# Patient Record
Sex: FEMALE | Race: WHITE | NOT HISPANIC OR LATINO | Employment: UNEMPLOYED | URBAN - METROPOLITAN AREA
[De-identification: names, ages, dates, MRNs, and addresses within clinical notes are randomized per-mention and may not be internally consistent; named-entity substitution may affect disease eponyms.]

---

## 2017-04-10 ENCOUNTER — ALLSCRIPTS OFFICE VISIT (OUTPATIENT)
Dept: OTHER | Facility: OTHER | Age: 47
End: 2017-04-10

## 2017-04-10 DIAGNOSIS — E66.9 OBESITY: ICD-10-CM

## 2017-04-10 DIAGNOSIS — I10 ESSENTIAL (PRIMARY) HYPERTENSION: ICD-10-CM

## 2017-07-12 ENCOUNTER — TRANSCRIBE ORDERS (OUTPATIENT)
Dept: ADMINISTRATIVE | Facility: HOSPITAL | Age: 47
End: 2017-07-12

## 2017-07-12 DIAGNOSIS — Z12.31 VISIT FOR SCREENING MAMMOGRAM: Primary | ICD-10-CM

## 2017-07-14 ENCOUNTER — HOSPITAL ENCOUNTER (OUTPATIENT)
Dept: MAMMOGRAPHY | Facility: HOSPITAL | Age: 47
Discharge: HOME | End: 2017-07-14
Attending: OBSTETRICS & GYNECOLOGY
Payer: COMMERCIAL

## 2017-07-14 DIAGNOSIS — Z12.31 VISIT FOR SCREENING MAMMOGRAM: ICD-10-CM

## 2017-07-14 PROCEDURE — G0202 SCR MAMMO BI INCL CAD: HCPCS

## 2018-01-13 NOTE — RESULT NOTES
Verified Results  (1) LIPID PANEL, FASTING [id number] 06:14AM Jorge Cassiesarah Palacio Order Number: [id number]     Test Name Result Flag Reference   CHOLESTEROL 161 mg/dL     HDL,DIRECT 56 mg/dL  40-60   Specimen collection should occur prior to Metamizole administration due to the potential for falsely depressed results  LDL CHOLESTEROL CALCULATED 98 mg/dL  0-100   - Patient Instructions: This is a fasting blood test  Water,black tea or black  coffee only after 9:00pm the night before test   Drink 2 glasses of water the morning of test     - Patient Instructions: This is a fasting blood test  Water,black tea or black  coffee only after 9:00pm the night before test Drink 2 glasses of water the morning of test   Triglyceride:         Normal              <150 mg/dl       Borderline High    150-199 mg/dl       High               200-499 mg/dl       Very High          >499 mg/dl  Cholesterol:         Desirable        <200 mg/dl      Borderline High  200-239 mg/dl      High             >239 mg/dl  HDL Cholesterol:        High    >59 mg/dL      Low     <41 mg/dL  LDL CALCULATED:    This screening LDL is a calculated result  It does not have the accuracy of the Direct Measured LDL in the monitoring of patients with hyperlipidemia and/or statin therapy  Direct Measure LDL (FHO285) must be ordered separately in these patients  TRIGLYCERIDES 37 mg/dL  <=150   Specimen collection should occur prior to N-Acetylcysteine or Metamizole administration due to the potential for falsely depressed results  (1) COMPREHENSIVE METABOLIC PANEL [id number] 33:04LF Cassie Patel Order Number: [id number]     Test Name Result Flag Reference   GLUCOSE,RANDM 96 mg/dL     If the patient is fasting, the ADA then defines impaired fasting glucose as > 100 mg/dL and diabetes as > or equal to 123 mg/dL     SODIUM 139 mmol/L  136-145   POTASSIUM 4 1 mmol/L  3 5-5 3   CHLORIDE 102 mmol/L  100-108   CARBON DIOXIDE 29 mmol/L  21-32   ANION GAP (CALC) 8 mmol/L  4-13   BLOOD UREA NITROGEN 17 mg/dL  5-25   CREATININE 0 63 mg/dL  0 60-1 30   Standardized to IDMS reference method   CALCIUM 8 8 mg/dL  8 3-10 1   BILI, TOTAL 0 40 mg/dL  0 20-1 00   ALK PHOSPHATAS 48 U/L     ALT (SGPT) 21 U/L  12-78   AST(SGOT) 17 U/L  5-45   ALBUMIN 3 6 g/dL  3 5-5 0   TOTAL PROTEIN 7 2 g/dL  6 4-8 2   eGFR Non-African American      >60 0 ml/min/1 73sq m   - Patient Instructions: This is a fasting blood test  Water,black tea or black  coffee only after 9:00pm the night before test Drink 2 glasses of water the morning of test   National Kidney Disease Education Program recommendations are as follows:  GFR calculation is accurate only with a steady state creatinine  Chronic Kidney disease less than 60 ml/min/1 73 sq  meters  Kidney failure less than 15 ml/min/1 73 sq  meters  (1) MICROALBUMIN CREATININE RATIO, RANDOM URINE [id number] 06:14AM Bradley Patel Order Number: [id number]     Test Name Result Flag Reference   MICROALBUMIN/ CREAT R <11 mg/g creatinine  0-30   MICROALBUMIN,URINE <5 0 mg/L  0 0-20 0   CREATININE URINE 44 9 mg/dL         Discussion/Summary   CHOLESTEROL LOOKS GOOD   NORMAL LIVER AND KIDNEY FUNCTION   OVERALL LOOKS VERY GOOD   - DR PATEL      Signatures   Electronically signed by : Pina Thompson MD; Dec 16 2016  9:10AM EST                       (Author)

## 2018-01-16 NOTE — RESULT NOTES
Verified Results  (1) LIPID PANEL, FASTING [id number] 06:45AM Kristi Patel     Test Name Result Flag Reference   CHOLESTEROL, TOTAL 179 mg/dL  125-200   HDL CHOLESTEROL 59 mg/dL  > OR = 46   TRIGLICERIDES 52 mg/dL  <706   LDL-CHOLESTEROL 110 mg/dL (calc)  <130   Desirable range <100 mg/dL for patients with CHD or  diabetes and <70 mg/dL for diabetic patients with  known heart disease  CHOL/HDLC RATIO 3 0 (calc)  < OR = 5 0   NON HDL CHOLESTEROL 120 mg/dL (calc)     Target for non-HDL cholesterol is 30 mg/dL higher than   LDL cholesterol target  (1) COMPREHENSIVE METABOLIC PANEL [id number] 02:88PT Kristi Patel     Test Name Result Flag Reference   GLUCOSE 93 mg/dL  65-99   Fasting reference interval   UREA NITROGEN (BUN) 20 mg/dL  7-25   CREATININE 0 71 mg/dL  0 50-1 10   eGFR NON-AFR  AMERICAN 102 mL/min/1 73m2  > OR = 60   eGFR AFRICAN AMERICAN 118 mL/min/1 73m2  > OR = 60   BUN/CREATININE RATIO   1-91   NOT APPLICABLE (calc)   SODIUM 138 mmol/L  135-146   POTASSIUM 4 6 mmol/L  3 5-5 3   CHLORIDE 101 mmol/L     CARBON DIOXIDE 26 mmol/L  19-30   CALCIUM 9 3 mg/dL  8 6-10 2   PROTEIN, TOTAL 7 0 g/dL  6 1-8 1   ALBUMIN 4 1 g/dL  3 6-5 1   GLOBULIN 2 9 g/dL (calc)  1 9-3 7   ALBUMIN/GLOBULIN RATIO 1 4 (calc)  1 0-2 5   BILIRUBIN, TOTAL 0 6 mg/dL  0 2-1 2   ALKALINE PHOSPHATASE 52 U/L     AST 14 U/L  10-35   ALT 12 U/L  6-29     *(Q) VITAMIN D, 25-HYDROXY, LC/MS/MS [id number] 06:45AM Kristi Patel     Test Name Result Flag Reference   VITAMIN D, 25-OH, TOTAL 19 ng/mL L    Vitamin D Status         25-OH Vitamin D:     Deficiency:                    <20 ng/mL  Insufficiency:             20 - 29 ng/mL  Optimal:                 > or = 30 ng/mL     For 25-OH Vitamin D testing on patients on   D2-supplementation and patients for whom quantitation   of D2 and D3 fractions is required, the QuestAssureD(TM)  25-OH VIT D, (D2,D3), LC/MS/MS is recommended: order   code 46309 (patients >2yrs)       For more information on this test, go to:  http://education  [email or web address]/faq/ZUJ573  (This link is being provided for   informational/educational purposes only )     (Q) TSH, 3RD GENERATION W/REFLEX TO FT4 [id number] 06:45AM Kristi Patel   REPORT COMMENT:  FASTING:YES     Test Name Result Flag Reference   TSH W/REFLEX TO FT4 2 57 mIU/L     Reference Range                         > or = 20 Years  0 40-4 50                              Pregnancy Ranges            First trimester    0 26-2 66            Second trimester   0 55-2 73            Third trimester    0 43-2 91       Plan  Vitamin D deficiency    · Vitamin D3 78378 UNIT Oral Capsule; TAKE 1 CAPSULE Weekly    Discussion/Summary   very low vitamin D   take Vitamin D 50,000 IU once a week for 8 weeks and then take Over the counter vitamin D 2000 IU once a day  Normal kidney and liver function   cholesterol looks good!   - Dr Diana Burt   Electronically signed by : Ky Reina MD; Feb 25 2016 10:35AM EST                       (Author)

## 2018-01-18 NOTE — PROGRESS NOTES
Assessment    1  Encounter for preventive health examination (V70 0) (Z00 00)   2  Obesity (278 00) (E66 9)   3  Benign essential hypertension (401 1) (I10)    Plan  Benign essential hypertension    · Lisinopril 10 MG Oral Tablet; TAKE 1 TABLET BY MOUTH ONCE DAILY  Benign essential hypertension, Obesity    · (1) COMPREHENSIVE METABOLIC PANEL; Status:Active; Requested for:10Apr2017;    · (1) MICROALBUMIN CREATININE RATIO, RANDOM URINE; Status:Active; Requested  for:10Apr2017; Health Maintenance    · Follow-up visit in 1 year Evaluation and Treatment  Follow-up  Status: Hold For -  Scheduling  Requested for: 10Apr2017   · Begin a limited exercise program ; Status:Complete;   Done: 30WDC7278   · Begin or continue regular aerobic exercise  Gradually work up to at least 3 sessions of 30  minutes of exercise a week ; Status:Complete;   Done: 18CFI3890   · Brush your teeth 3 times a day and floss at least once a day ; Status:Complete;   Done:  06MFM9816   · Drink plenty of fluids ; Status:Complete;   Done: 73MZZ3971   · Eat a low fat and low cholesterol diet ; Status:Complete;   Done: 89REA9779   · Eat a normal well-balanced diet ; Status:Complete;   Done: 10ZBT3913   · Use a sun block product with an SPF of 15 or more ; Status:Complete;   Done:  77KVL5796   · Vitamins can help you get daily requirements that your diet may not be giving you ;  Status:Complete;   Done: 06BQO7454   · We recommend routine visits to a dentist ; Status:Complete;   Done: 64AOJ0729   · We recommend that you bring your body mass index down to 26 ; Status:Complete;    Done: 35NVO7974   · Call [phone] if: You find a new or different kind of lump in your breast ;  Status:Complete;   Done: 54JCM0108   · Call [phone] if: You have any warning signs of skin cancer ; Status:Complete;    Done: 36YXU2921    Discussion/Summary  health maintenance visit Currently, she eats an adequate diet and eats a poor diet   the risks and benefits of cervical cancer screening were discussed cervical cancer screening is needed every three years      Chief Complaint  pt here for physical      History of Present Illness  HM, Adult Female: The patient is being seen for a health maintenance evaluation  The last health maintenance visit was 1 year(s) ago  General Health: The patient's health since the last visit is described as good  She has regular dental visits  She complains of vision problems  She denies hearing loss  Immunizations status: up to date  Lifestyle:  She consumes a diverse and healthy diet  She does not have any weight concerns  She exercises regularly  She does not use tobacco  She denies alcohol use  She denies drug use  Reproductive health:  she reports abnormal menses  Menstrual history: The cycles have been less frequent  she uses no contraception  she is sexually active  pregnancy history: G 1P 1, 1  Screening: cancer screening reviewed and updated  metabolic screening reviewed and updated  risk screening reviewed and updated  Review of Systems    Constitutional: No fever, no chills, feels well, no tiredness, no recent weight gain or weight loss  Eyes: No complaints of eye pain, no red eyes, no eyesight problems, no discharge, no dry eyes, no itching of eyes  ENT: no complaints of earache, no loss of hearing, no nose bleeds, no nasal discharge, no sore throat, no hoarseness  Cardiovascular: No complaints of slow heart rate, no fast heart rate, no chest pain, no palpitations, no leg claudication, no lower extremity edema  Respiratory: No complaints of shortness of breath, no wheezing, no cough, no SOB on exertion, no orthopnea, no PND  Gastrointestinal: No complaints of abdominal pain, no constipation, no nausea or vomiting, no diarrhea, no bloody stools  Genitourinary: No complaints of dysuria, no incontinence, no pelvic pain, no dysmenorrhea, no vaginal discharge or bleeding     Musculoskeletal: No complaints of arthralgias, no myalgias, no joint swelling or stiffness, no limb pain or swelling  Integumentary: No complaints of skin rash or lesions, no itching, no skin wounds, no breast pain or lump  Neurological: No complaints of headache, no confusion, no convulsions, no numbness, no dizziness or fainting, no tingling, no limb weakness, no difficulty walking  Psychiatric: Not suicidal, no sleep disturbance, no anxiety or depression, no change in personality, no emotional problems  Endocrine: No complaints of proptosis, no hot flashes, no muscle weakness, no deepening of the voice, no feelings of weakness  Hematologic/Lymphatic: No complaints of swollen glands, no swollen glands in the neck, does not bleed easily, does not bruise easily  Active Problems    1  Acid reflux disease (530 81) (K21 9)   2  Anxiety (300 00) (F41 9)   3  Benign essential hypertension (401 1) (I10)   4  Encounter for gynecological examination without abnormal finding (V72 31) (Z01 419)   5  Influenza vaccine needed (V04 81) (Z23)   6  Need for Tdap vaccination (V06 1) (Z23)   7  Obesity (278 00) (E66 9)   8  Seasonal allergies (477 9) (J30 2)   9   Vitamin D deficiency (268 9) (E55 9)    Past Medical History    · 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)   · History of Anaphylaxis (V13 81)   · History of herpes labialis (V12 09) (Z86 19)   · History of herpes zoster (V12 09) (Z86 19)   · History of Left foot pain (729 5) (M79 672)   · History of Malignant neoplasm without specification of site (199 1) (C80 1)    Surgical History    · History of Biopsy Tongue Location Base   · History of  Section   · History of Glossectomy Complete With Unilateral Radical Neck Dissection    Family History  Mother    · Family history of Diabetes Mellitus (V18 0)  Father    · Family history of Basal Cell Adenocarcinoma   · Family history of cardiac disorder (V17 49) (Z80 55)    Social History    · Living Situations   · daughter and  · Marital History - Currently    · Never A Smoker   · Social alcohol use (Z78 9)   · Tea    Current Meds   1  Aleve TABS; Therapy: (Recorded:04Oct2013) to Recorded   2  Claritin-D 24 Hour  MG Oral Tablet Extended Release 24 Hour; TAKE 1 TABLET   DAILY AS DIRECTED; Therapy: 72HCT6892 to (Evaluate:35Gmp8726) Recorded   3  EpiPen 2-Johnnie 0 3 MG/0 3ML Injection Solution Auto-injector; INJECT 0 3ML   INTRAMUSCULARLY AS DIRECTED; Therapy: 06CGC2366 to (Last Rx:13Jan2016)  Requested for: 01PPT5614 Ordered   4  Lisinopril 10 MG Oral Tablet; TAKE 1 TABLET BY MOUTH ONCE DAILY; Therapy: 80MAC5062 to (Evaluate:08Jan2017)  Requested for: 18DRB6552; Last   Rx:74Fjt4983 Ordered   5  Pantoprazole Sodium 40 MG Oral Tablet Delayed Release; TAKE 1 TABLET DAILY; Therapy: 98GPF0993 to (Evaluate:49Tzk5358)  Requested for: 04IFL0737; Last   Rx:60Nxk9618 Ordered   6  Vitamin D3 2000 UNIT Oral Tablet; Take 1 tablet daily; Therapy: 30MRC5746 to (Evaluate:11Tzm9193) Recorded    Allergies    1  Sulfites   2  Penicillins    3  Dust   4  Other   5  Peanuts   6  Seasonal    Vitals   Recorded: 10Apr2017 09:01AM Recorded: 10Apr2017 09:00AM   Heart Rate 98    Respiration 18    Systolic 584    Diastolic 84    Height  5 ft 3 11 in   Weight  208 lb 2 oz   BMI Calculated  36 74   BSA Calculated  1 97   O2 Saturation 97      Physical Exam    Constitutional   General appearance: No acute distress, well appearing and well nourished  Head and Face   Head and face: Normal     Palpation of the face and sinuses: No sinus tenderness  Eyes   Conjunctiva and lids: No swelling, erythema or discharge  Pupils and irises: Equal, round, reactive to light  Ophthalmoscopic examination: Normal fundi and optic discs  Ears, Nose, Mouth, and Throat   External inspection of ears and nose: Normal     Otoscopic examination: Tympanic membranes translucent with normal light reflex  Canals patent without erythema      Hearing: Normal     Nasal mucosa, septum, and turbinates: Normal without edema or erythema  Lips, teeth, and gums: Normal, good dentition  Oropharynx: Normal with no erythema, edema, exudate or lesions  Neck   Neck: Supple, symmetric, trachea midline, no masses  Thyroid: Normal, no thyromegaly  Pulmonary   Respiratory effort: No increased work of breathing or signs of respiratory distress  Percussion of chest: Normal     Auscultation of lungs: Clear to auscultation  Cardiovascular   Palpation of heart: Normal PMI, no thrills  Auscultation of heart: Normal rate and rhythm, normal S1 and S2, no murmurs  Examination of extremities for edema and/or varicosities: Normal     Chest   Breasts: Normal, no dimpling or skin changes appreciated  Chest: Normal     Lymphatic   Palpation of lymph nodes in neck: No lymphadenopathy  Palpation of lymph nodes in axillae: No lymphadenopathy  Musculoskeletal   Gait and station: Normal     Digits and nails: Normal without clubbing or cyanosis  Joints, bones, and muscles: Normal     Range of motion: Normal     Stability: Normal     Muscle strength/tone: Normal     Skin   Skin and subcutaneous tissue: Normal without rashes or lesions  Palpation of skin and subcutaneous tissue: Normal turgor  Neurologic   Cranial nerves: Cranial nerves II-XII intact  Cortical function: Normal mental status  Reflexes: 2+ and symmetric  Sensation: No sensory loss  Coordination: Normal finger to nose and heel to shin  Psychiatric   Judgment and insight: Normal     Orientation to person, place, and time: Normal     Recent and remote memory: Intact  Mood and affect: Normal        Results/Data  PHQ-2 Adult Depression Screening 31Dne2814 09:01AM User, Ahs     Test Name Result Flag Reference   PHQ-2 Adult Depression Score 0     Over the last two weeks, how often have you been bothered by any of the following problems?   Little interest or pleasure in doing things: Not at all - 0  Feeling down, depressed, or hopeless: Not at all - 0   PHQ-2 Adult Depression Screening Negative         Signatures   Electronically signed by : Moisés Angel MD; Apr 10 2017  9:21AM EST                       (Author)

## 2018-01-18 NOTE — RESULT NOTES
Verified Results  * XR FOOT 3+ VIEW LEFT [id number] 08:23AM Chit, [place] Order Number: [id number]     Test Name Result Flag Reference   XR FOOT 3+ VW LEFT (Report)     LEFT FOOT     INDICATION: Chronic medial foot pain     COMPARISON: None     VIEWS: 3; 3 images     FINDINGS:     There is no acute fracture or dislocation  Large calcaneal heel spur is noted  Joint spaces within the foot are preserved  No lytic or blastic lesions are seen  Soft tissues are unremarkable  IMPRESSION:     Large calcaneal heel spur       Workstation performed: [id number]     Signed by:   Aristides Santiago MD   3/10/16       Discussion/Summary   large heel spur on left foot   may see podiatrist as instructed     - Dr Suzanna Nelson   Electronically signed by : Michelle Robles MD; Mar 10 2016  2:36PM EST                       (Author)

## 2018-01-22 VITALS
HEART RATE: 98 BPM | DIASTOLIC BLOOD PRESSURE: 84 MMHG | OXYGEN SATURATION: 97 % | SYSTOLIC BLOOD PRESSURE: 132 MMHG | WEIGHT: 208.13 LBS | HEIGHT: 63 IN | RESPIRATION RATE: 18 BRPM | BODY MASS INDEX: 36.88 KG/M2

## 2018-04-25 DIAGNOSIS — I10 ESSENTIAL HYPERTENSION: Primary | ICD-10-CM

## 2018-04-25 RX ORDER — LISINOPRIL 10 MG/1
TABLET ORAL
Qty: 90 TABLET | Refills: 0 | Status: SHIPPED | OUTPATIENT
Start: 2018-04-25 | End: 2018-07-26 | Stop reason: SDUPTHER

## 2018-07-26 DIAGNOSIS — I10 ESSENTIAL HYPERTENSION: ICD-10-CM

## 2018-07-26 RX ORDER — LISINOPRIL 10 MG/1
TABLET ORAL
Qty: 90 TABLET | Refills: 0 | Status: SHIPPED | OUTPATIENT
Start: 2018-07-26 | End: 2018-10-25 | Stop reason: SDUPTHER

## 2018-08-13 RX ORDER — LORATADINE AND PSEUDOEPHEDRINE 10; 240 MG/1; MG/1
1 TABLET, EXTENDED RELEASE ORAL AS NEEDED
COMMUNITY
Start: 2016-01-13

## 2018-08-13 RX ORDER — PANTOPRAZOLE SODIUM 40 MG/1
40 TABLET, DELAYED RELEASE ORAL DAILY
Refills: 3 | COMMUNITY
Start: 2018-07-25 | End: 2018-11-24 | Stop reason: SDUPTHER

## 2018-08-13 RX ORDER — NAPROXEN SODIUM 220 MG
TABLET ORAL
COMMUNITY

## 2018-08-13 RX ORDER — EPINEPHRINE 0.3 MG/.3ML
0.3 INJECTION SUBCUTANEOUS
COMMUNITY
Start: 2016-01-13 | End: 2018-08-14 | Stop reason: SDUPTHER

## 2018-08-14 ENCOUNTER — OFFICE VISIT (OUTPATIENT)
Dept: FAMILY MEDICINE CLINIC | Facility: CLINIC | Age: 48
End: 2018-08-14
Payer: COMMERCIAL

## 2018-08-14 VITALS
WEIGHT: 199 LBS | HEART RATE: 80 BPM | HEIGHT: 63 IN | RESPIRATION RATE: 18 BRPM | DIASTOLIC BLOOD PRESSURE: 70 MMHG | SYSTOLIC BLOOD PRESSURE: 110 MMHG | BODY MASS INDEX: 35.26 KG/M2

## 2018-08-14 DIAGNOSIS — Z12.31 VISIT FOR SCREENING MAMMOGRAM: ICD-10-CM

## 2018-08-14 DIAGNOSIS — I10 BENIGN ESSENTIAL HYPERTENSION: ICD-10-CM

## 2018-08-14 DIAGNOSIS — E55.9 VITAMIN D DEFICIENCY: ICD-10-CM

## 2018-08-14 DIAGNOSIS — Z91.030 ALLERGY TO BEE STING: ICD-10-CM

## 2018-08-14 DIAGNOSIS — R22.1 LUMP IN NECK: ICD-10-CM

## 2018-08-14 DIAGNOSIS — Z00.00 ENCOUNTER FOR WELL ADULT EXAM WITHOUT ABNORMAL FINDINGS: Primary | ICD-10-CM

## 2018-08-14 DIAGNOSIS — K21.9 GASTROESOPHAGEAL REFLUX DISEASE WITHOUT ESOPHAGITIS: ICD-10-CM

## 2018-08-14 DIAGNOSIS — N95.9 PERIMENOPAUSAL DISORDER: ICD-10-CM

## 2018-08-14 PROCEDURE — 3725F SCREEN DEPRESSION PERFORMED: CPT | Performed by: FAMILY MEDICINE

## 2018-08-14 PROCEDURE — 99396 PREV VISIT EST AGE 40-64: CPT | Performed by: FAMILY MEDICINE

## 2018-08-14 RX ORDER — RANITIDINE 150 MG/1
150 CAPSULE ORAL 2 TIMES DAILY
Qty: 180 CAPSULE | Refills: 0 | Status: SHIPPED | OUTPATIENT
Start: 2018-08-14 | End: 2018-11-07 | Stop reason: SDUPTHER

## 2018-08-14 RX ORDER — FLUOXETINE 10 MG/1
10 TABLET, FILM COATED ORAL DAILY
Qty: 90 TABLET | Refills: 0 | Status: SHIPPED | OUTPATIENT
Start: 2018-08-14 | End: 2018-11-07 | Stop reason: SDUPTHER

## 2018-08-14 RX ORDER — EPINEPHRINE 0.3 MG/.3ML
0.3 INJECTION SUBCUTANEOUS ONCE
Qty: 0.3 ML | Refills: 3 | Status: SHIPPED | OUTPATIENT
Start: 2018-08-14 | End: 2019-02-24

## 2018-08-14 NOTE — PROGRESS NOTES
Noam Martinez was seen today for annual exam     Diagnoses and all orders for this visit:    Encounter for well adult exam without abnormal findings    Vitamin D deficiency  -     Vitamin D 25 hydroxy; Future    Benign essential hypertension  -     Comprehensive metabolic panel  -     Lipid Panel with Direct LDL reflex; Future  -     CBC and differential; Future    Lump in neck  -     US head neck soft tissue; Future    Allergy to bee sting  -     EPINEPHrine (EPIPEN 2-VINCE) 0 3 mg/0 3 mL SOAJ; Inject 0 3 mL (0 3 mg total) into a muscle once for 1 dose    Perimenopausal disorder  -     FLUoxetine (PROzac) 10 MG tablet; Take 1 tablet (10 mg total) by mouth daily    Gastroesophageal reflux disease without esophagitis  -     ranitidine (ZANTAC) 150 MG capsule; Take 1 capsule (150 mg total) by mouth 2 (two) times a day    Visit for screening mammogram  -     Mammo screening bilateral w cad; Future      Patient Counseling:  --Nutrition: Stressed importance of moderation in sodium/caffeine intake, saturated fat and cholesterol, caloric balance, sufficient intake of fresh fruits, vegetables, fiber, calcium, iron, and 1 mg of folate supplement per day   --Discussed  calcium supplement, and the daily use of baby aspirin  --Exercise: Stressed the importance of regular exercise  --Substance Abuse: Discussed cessation/primary prevention of tobacco, alcohol, or other drug use; driving or other dangerous activities under the influence; availability of treatment for abuse  --Sexuality: Discussed sexually transmitted diseases, partner selection, use of condoms, avoidance of unintended pregnancy  and contraceptive alternatives  --Injury prevention: Discussed safety belts, safety helmets, smoke detector, smoking near bedding or upholstery  --Dental health: Discussed importance of regular tooth brushing, flossing, and dental visits  --Immunizations reviewed    --Discussed benefits of screening colonoscopy    Chief Complaint Patient presents with    Annual Exam       HPI    Patient here for a comprehensive physical exam The patient reports no problems    Do you take any herbs or supplements that were not prescribed by a doctor? no   Are you taking calcium supplements? no   Are you taking aspirin daily? no  How often do you exercise? Walking around   Diet?  3-4 servings of fruits and vegetables   Dental visit:  A couple of years ago     Vision test: recently  Colonoscopy:  5 years ago      History:  LMP: perimenopausal- 8 months ago was last periods   Last pap date: 1 year ago   Abnormal pap? no  : 1  Para: 1          History   Sexual Activity    Sexual activity: Not on file             Review of Systems   Constitutional: Negative  HENT: Negative  Eyes: Negative  Respiratory: Negative  Cardiovascular: Negative  Gastrointestinal: Negative  Endocrine: Negative  Genitourinary: Negative  Musculoskeletal: Negative  Skin: Negative  Allergic/Immunologic: Negative  Neurological: Negative  Hematological: Negative  Psychiatric/Behavioral: Negative  Family History   Problem Relation Age of Onset    Diabetes Mother     Cancer Father     Heart disease Father        Past Medical History:   Diagnosis Date    Anaphylaxis     Cancer ([address] )     Tongue,     Herpes zoster     last Assessed 2016         Social History     Social History    Marital status: /Civil Union     Spouse name: N/A    Number of children: N/A    Years of education: N/A     Occupational History    Not on file       Social History Main Topics    Smoking status: Never Smoker    Smokeless tobacco: Former User    Alcohol use Yes      Comment: Social    Drug use: Unknown    Sexual activity: Not on file     Other Topics Concern    Not on file     Social History Narrative    Drinks Tea       Current Outpatient Prescriptions on File Prior to Visit   Medication Sig Dispense Refill    lisinopril (ZESTRIL) 10 mg tablet TAKE 1 TABLET BY MOUTH ONCE DAILY 90 tablet 0     No current facility-administered medications on file prior to visit  Allergies   Allergen Reactions    Penicillins      Annotation - [id number]: as achild    Seasonal Ic  [Cholestatin]     Sulfites Hives, Rash and Throat Swelling         Vitals:    08/14/18 1539   BP: 110/70   BP Location: Left arm   Patient Position: Sitting   Cuff Size: Standard   Pulse: 80   Resp: 18   Weight: 90 3 kg (199 lb)   Height: 5' 3" (1 6 m)         Physical Exam   Constitutional: She is oriented to person, place, and time  Vital signs are normal  She appears well-developed and well-nourished  HENT:   Head: Normocephalic and atraumatic  Nose: Nose normal    Mouth/Throat: Oropharynx is clear and moist    Eyes: Pupils are equal, round, and reactive to light  Neck: Normal range of motion  Neck supple  No thyromegaly present  Left chin   Cardiovascular: Normal rate and regular rhythm  No murmur heard  Pulmonary/Chest: Effort normal and breath sounds normal    Abdominal: Soft  Bowel sounds are normal    Musculoskeletal: Normal range of motion  She exhibits no edema or deformity  Lymphadenopathy:     She has cervical adenopathy  Neurological: She is alert and oriented to person, place, and time  She has normal reflexes  Skin: Skin is warm  No rash noted  No erythema  Psychiatric: She has a normal mood and affect   Her behavior is normal

## 2018-09-07 LAB
25(OH)D3 SERPL-MCNC: 80 NG/ML (ref 30–100)
ALBUMIN SERPL-MCNC: 4.4 G/DL (ref 3.6–5.1)
ALBUMIN/GLOB SERPL: 1.7 (CALC) (ref 1–2.5)
ALP SERPL-CCNC: 51 U/L (ref 33–115)
ALT SERPL-CCNC: 14 U/L (ref 6–29)
AST SERPL-CCNC: 19 U/L (ref 10–35)
BASOPHILS # BLD AUTO: 20 CELLS/UL (ref 0–200)
BASOPHILS NFR BLD AUTO: 0.3 %
BILIRUB SERPL-MCNC: 0.8 MG/DL (ref 0.2–1.2)
BUN SERPL-MCNC: 10 MG/DL (ref 7–25)
BUN/CREAT SERPL: NORMAL (CALC) (ref 6–22)
CALCIUM SERPL-MCNC: 9.6 MG/DL (ref 8.6–10.2)
CHLORIDE SERPL-SCNC: 98 MMOL/L (ref 98–110)
CHOLEST SERPL-MCNC: 180 MG/DL
CHOLEST/HDLC SERPL: 3.1 (CALC)
CO2 SERPL-SCNC: 28 MMOL/L (ref 20–32)
CREAT SERPL-MCNC: 0.72 MG/DL (ref 0.5–1.1)
EOSINOPHIL # BLD AUTO: 252 CELLS/UL (ref 15–500)
EOSINOPHIL NFR BLD AUTO: 3.7 %
ERYTHROCYTE [DISTWIDTH] IN BLOOD BY AUTOMATED COUNT: 13.3 % (ref 11–15)
GLOBULIN SER CALC-MCNC: 2.6 G/DL (CALC) (ref 1.9–3.7)
GLUCOSE SERPL-MCNC: 98 MG/DL (ref 65–99)
HCT VFR BLD AUTO: 42.5 % (ref 35–45)
HDLC SERPL-MCNC: 59 MG/DL
HGB BLD-MCNC: 14 G/DL (ref 11.7–15.5)
LDLC SERPL CALC-MCNC: 105 MG/DL (CALC)
LYMPHOCYTES # BLD AUTO: 1884 CELLS/UL (ref 850–3900)
LYMPHOCYTES NFR BLD AUTO: 27.7 %
MCH RBC QN AUTO: 29.4 PG (ref 27–33)
MCHC RBC AUTO-ENTMCNC: 32.9 G/DL (ref 32–36)
MCV RBC AUTO: 89.3 FL (ref 80–100)
MONOCYTES # BLD AUTO: 653 CELLS/UL (ref 200–950)
MONOCYTES NFR BLD AUTO: 9.6 %
NEUTROPHILS # BLD AUTO: 3992 CELLS/UL (ref 1500–7800)
NEUTROPHILS NFR BLD AUTO: 58.7 %
NONHDLC SERPL-MCNC: 121 MG/DL (CALC)
PLATELET # BLD AUTO: 238 THOUSAND/UL (ref 140–400)
PMV BLD REES-ECKER: 11.2 FL (ref 7.5–12.5)
POTASSIUM SERPL-SCNC: 4.4 MMOL/L (ref 3.5–5.3)
PROT SERPL-MCNC: 7 G/DL (ref 6.1–8.1)
RBC # BLD AUTO: 4.76 MILLION/UL (ref 3.8–5.1)
SL AMB EGFR AFRICAN AMERICAN: 114 ML/MIN/1.73M2
SL AMB EGFR NON AFRICAN AMERICAN: 98 ML/MIN/1.73M2
SODIUM SERPL-SCNC: 136 MMOL/L (ref 135–146)
TRIGL SERPL-MCNC: 69 MG/DL
WBC # BLD AUTO: 6.8 THOUSAND/UL (ref 3.8–10.8)

## 2018-09-17 ENCOUNTER — HOSPITAL ENCOUNTER (OUTPATIENT)
Dept: ULTRASOUND IMAGING | Facility: HOSPITAL | Age: 48
Discharge: HOME | End: 2018-09-17
Payer: COMMERCIAL

## 2018-09-17 DIAGNOSIS — R22.1 LUMP IN NECK: ICD-10-CM

## 2018-09-17 PROCEDURE — 76536 US EXAM OF HEAD AND NECK: CPT

## 2018-10-25 DIAGNOSIS — I10 ESSENTIAL HYPERTENSION: ICD-10-CM

## 2018-10-25 RX ORDER — LISINOPRIL 10 MG/1
TABLET ORAL
Qty: 90 TABLET | Refills: 3 | Status: SHIPPED | OUTPATIENT
Start: 2018-10-25 | End: 2019-10-11 | Stop reason: SDUPTHER

## 2018-11-07 DIAGNOSIS — N95.9 PERIMENOPAUSAL DISORDER: ICD-10-CM

## 2018-11-07 DIAGNOSIS — K21.9 GASTROESOPHAGEAL REFLUX DISEASE WITHOUT ESOPHAGITIS: ICD-10-CM

## 2018-11-07 RX ORDER — FLUOXETINE 10 MG/1
TABLET, FILM COATED ORAL
Qty: 90 TABLET | Refills: 0 | Status: SHIPPED | OUTPATIENT
Start: 2018-11-07 | End: 2019-01-31 | Stop reason: SDUPTHER

## 2018-11-07 RX ORDER — RANITIDINE 150 MG/1
TABLET ORAL
Qty: 180 TABLET | Refills: 0 | Status: ON HOLD | OUTPATIENT
Start: 2018-11-07 | End: 2019-01-29 | Stop reason: ALTCHOICE

## 2018-11-12 ENCOUNTER — HOSPITAL ENCOUNTER (OUTPATIENT)
Dept: MAMMOGRAPHY | Facility: HOSPITAL | Age: 48
Discharge: HOME | End: 2018-11-12
Payer: COMMERCIAL

## 2018-11-12 VITALS — WEIGHT: 199 LBS | HEIGHT: 63 IN | BODY MASS INDEX: 35.26 KG/M2

## 2018-11-12 DIAGNOSIS — Z12.31 VISIT FOR SCREENING MAMMOGRAM: ICD-10-CM

## 2018-11-12 PROCEDURE — 77067 SCR MAMMO BI INCL CAD: CPT

## 2018-11-12 PROCEDURE — 77063 BREAST TOMOSYNTHESIS BI: CPT

## 2018-11-14 ENCOUNTER — HOSPITAL ENCOUNTER (OUTPATIENT)
Dept: MAMMOGRAPHY | Facility: CLINIC | Age: 48
Discharge: HOME | End: 2018-11-14
Payer: COMMERCIAL

## 2018-11-14 ENCOUNTER — HOSPITAL ENCOUNTER (OUTPATIENT)
Dept: ULTRASOUND IMAGING | Facility: CLINIC | Age: 48
Discharge: HOME | End: 2018-11-14
Payer: COMMERCIAL

## 2018-11-14 DIAGNOSIS — R92.8 ABNORMAL MAMMOGRAM: ICD-10-CM

## 2018-11-14 PROCEDURE — G0279 TOMOSYNTHESIS, MAMMO: HCPCS

## 2018-11-14 PROCEDURE — 77065 DX MAMMO INCL CAD UNI: CPT

## 2018-11-15 NOTE — PROGRESS NOTES
Met with patient and Dr Dorna Goltz regarding recommendation for;      _____ RIGHT ___x___LEFT      _____Ultrasound guided  ___x___Stereotactic  Breast biopsy  __x___Verbalized understanding        Blood thinners:  _____yes __x___no    Date stopped: ___n/a________    Biopsy teaching sheet given:  ___x____yes ______no

## 2018-11-19 ENCOUNTER — IMMUNIZATION (OUTPATIENT)
Dept: FAMILY MEDICINE CLINIC | Facility: CLINIC | Age: 48
End: 2018-11-19
Payer: COMMERCIAL

## 2018-11-19 DIAGNOSIS — Z23 ENCOUNTER FOR IMMUNIZATION: ICD-10-CM

## 2018-11-19 DIAGNOSIS — Z23 NEED FOR INFLUENZA VACCINATION: Primary | ICD-10-CM

## 2018-11-19 PROCEDURE — 90686 IIV4 VACC NO PRSV 0.5 ML IM: CPT

## 2018-11-19 PROCEDURE — 90471 IMMUNIZATION ADMIN: CPT

## 2018-11-24 DIAGNOSIS — K21.9 GASTROESOPHAGEAL REFLUX DISEASE WITHOUT ESOPHAGITIS: Primary | ICD-10-CM

## 2018-11-25 RX ORDER — PANTOPRAZOLE SODIUM 40 MG/1
TABLET, DELAYED RELEASE ORAL
Qty: 90 TABLET | Refills: 3 | Status: SHIPPED | OUTPATIENT
Start: 2018-11-25 | End: 2019-11-15 | Stop reason: SDUPTHER

## 2018-11-26 ENCOUNTER — HOSPITAL ENCOUNTER (OUTPATIENT)
Dept: MAMMOGRAPHY | Facility: CLINIC | Age: 48
Discharge: HOME | End: 2018-11-26
Admitting: RADIOLOGY
Payer: COMMERCIAL

## 2018-11-26 ENCOUNTER — HOSPITAL ENCOUNTER (OUTPATIENT)
Dept: MAMMOGRAPHY | Facility: CLINIC | Age: 48
Discharge: HOME | End: 2018-11-26
Payer: COMMERCIAL

## 2018-11-26 VITALS — HEART RATE: 60 BPM | DIASTOLIC BLOOD PRESSURE: 80 MMHG | SYSTOLIC BLOOD PRESSURE: 132 MMHG

## 2018-11-26 DIAGNOSIS — R92.8 ABNORMAL MAMMOGRAM: ICD-10-CM

## 2018-11-26 PROCEDURE — 88305 TISSUE EXAM BY PATHOLOGIST: CPT | Performed by: PATHOLOGY

## 2018-11-26 PROCEDURE — 19081 BX BREAST 1ST LESION STRTCTC: CPT

## 2018-11-26 RX ORDER — CHLOROPROCAINE HYDROCHLORIDE 30 MG/ML
10 INJECTION, SOLUTION EPIDURAL; INFILTRATION; INTRACAUDAL; PERINEURAL ONCE
Status: COMPLETED | OUTPATIENT
Start: 2018-11-26 | End: 2018-11-26

## 2018-11-26 RX ORDER — LIDOCAINE HYDROCHLORIDE 10 MG/ML
4 INJECTION, SOLUTION INFILTRATION; PERINEURAL ONCE
Status: COMPLETED | OUTPATIENT
Start: 2018-11-26 | End: 2018-11-26

## 2018-11-26 RX ADMIN — LIDOCAINE HYDROCHLORIDE 4 ML: 10 INJECTION, SOLUTION INFILTRATION; PERINEURAL at 08:50

## 2018-11-26 RX ADMIN — CHLOROPROCAINE HYDROCHLORIDE 10 ML: 30 INJECTION, SOLUTION EPIDURAL; INFILTRATION; INTRACAUDAL; PERINEURAL at 08:50

## 2018-11-26 NOTE — DISCHARGE INSTR - OTHER ORDERS
POST LARGE CORE BREAST BIOPSY PATIENT INFORMATION      1  Place an ice pack inside your bra over the top of the dressing every hour for 20 minutes (20 minutes on, 60 minutes off)  Do this until bedtime  2  Do not shower or bathe until the following morning  3  You may bathe your breast carefully with the steri-strips in place  Be careful    Not to loosen them  The steri-strips will fall off in 3-5 days  4  You may have mild discomfort, and you may have some bruising where the   Needle entered the skin  This should clear within 5-7 days  5  If you need medicine for discomfort, take acetaminophen products such as   Tylenol  You may also take Advil or Motrin products  6  Do not participate in strenuous activities such as-tennis, aerobics, skiing,  Weight lifting, etc  for 24 hours  Refrain from swimming/soaking for 72 hours  7  Wearing a bra for sleeping may be more comfortable for the first 24-48 hours  8  Watch for continued bleeding, pain or fever over 101; please call with any questions or concerns  For procedures done at the [facility] call:  Anitha Martínez RN at [phone]  Lis Dowling RN at [phone]                    *After 4 PM call the Interventional Radiology Department                    [phone] and ask to speak with the nurse on call  For procedures done at the [address] [place] [address] call:         Skylar Scales RN at   *After 4 PM call the Interventional Radiology Department   [phone] and ask to speak with the nurse on call  For procedures done at [address] [place] [address] call: The Radiology Nurse at [phone]  *After 4 PM call your physician, or go to the Emergency Department  9          The final results of your biopsy are usually available within one week

## 2018-11-26 NOTE — PROGRESS NOTES
Procedure type:    _____ultrasound guided __x___stereotactic    Breast:    __x___Left _____Right    Location:     Needle: 8g Mammotome Revolve    # of passes: 3 cores with calcs (specimen A) 2 cores without calcs (specimen B)    Clip: Mammomark triple twist    Performed by: Dr Rodolfo Bourgeois held for 5 minutes by: Gisella Gan Strips:    __x___yes _____no    Band aid:    __x___yes_____no    Tape and guaze:    __x___yes _____no    Tolerated procedure:    __x___yes _____no

## 2018-11-26 NOTE — PROGRESS NOTES
Per David Borrego, the pharmacist, do not use the Lidocaine with Epinephrine due to pt's Sulfite allergy  Per David Borrego, sulfite is a preservative in the epinephrine which pt has an allergy to

## 2018-11-27 NOTE — PROGRESS NOTES
Post procedure call completed on 11/17/18 @ 1215      Bleeding: _____yes __x___no    Pain: _____yes __x____no    Redness/Swelling: ______yes __x____no    Band aid removed: __x___yes _____no    Steri-Strips intact: __x____yes _____no

## 2018-11-28 DIAGNOSIS — N60.99 INTRADUCTAL HYPERPLASIA WITHOUT ATYPIA OF BREAST: Primary | ICD-10-CM

## 2018-11-30 ENCOUNTER — TELEPHONE (OUTPATIENT)
Dept: MAMMOGRAPHY | Facility: CLINIC | Age: 48
End: 2018-11-30

## 2018-11-30 NOTE — PROGRESS NOTES
Patient Past Medical History: HTN;   Ulcer;   Arthritis          Allergies: Sulfites-Anaphylaxis      Bees-Anaphylaxis      PCN - Unknown        Past Breast History:     Previous Biopsy:   Yes______ No___x_____      Breast: Right____ Left______       Malignant______ Benign_______      Treatments if applicable        Present Breast History:     Right__________    Left_____x________     Density_________    Calcifications____________   Palpable______________      Patient notified of results on:  11/30/18 by Dr Aaliyah Cade    Date of Appointment with Surgeon Dr Ama Gil on 12/19/18 @ 1 pm

## 2018-12-17 PROBLEM — N60.92 ATYPICAL DUCTAL HYPERPLASIA OF LEFT BREAST: Status: ACTIVE | Noted: 2018-12-17

## 2018-12-19 ENCOUNTER — CONSULT (OUTPATIENT)
Dept: SURGICAL ONCOLOGY | Facility: CLINIC | Age: 48
End: 2018-12-19
Payer: COMMERCIAL

## 2018-12-19 VITALS
TEMPERATURE: 98.4 F | RESPIRATION RATE: 16 BRPM | WEIGHT: 193 LBS | BODY MASS INDEX: 34.2 KG/M2 | HEART RATE: 82 BPM | SYSTOLIC BLOOD PRESSURE: 160 MMHG | HEIGHT: 63 IN | DIASTOLIC BLOOD PRESSURE: 82 MMHG

## 2018-12-19 DIAGNOSIS — N60.92 ATYPICAL DUCTAL HYPERPLASIA OF LEFT BREAST: Primary | ICD-10-CM

## 2018-12-19 PROBLEM — C02.9 TONGUE CANCER (HCC): Status: ACTIVE | Noted: 2018-12-19

## 2018-12-19 PROBLEM — T78.2XXA ANAPHYLAXIS: Status: ACTIVE | Noted: 2018-12-19

## 2018-12-19 PROCEDURE — 99245 OFF/OP CONSLTJ NEW/EST HI 55: CPT | Performed by: SURGERY

## 2018-12-19 RX ORDER — OXYCODONE HYDROCHLORIDE AND ACETAMINOPHEN 5; 325 MG/1; MG/1
1 TABLET ORAL EVERY 4 HOURS PRN
Qty: 6 TABLET | Refills: 0 | Status: SHIPPED | OUTPATIENT
Start: 2018-12-19 | End: 2019-02-08 | Stop reason: ALTCHOICE

## 2018-12-19 NOTE — LETTER
December 19, 2018     Geovanna Bill MD  [address] [facility] [place]  [address] [address]    Patient: German Silverio   YOB: 1970   Date of Visit: 12/19/2018       Dear Dr Caroline Masters:    Thank you for referring German Silverio to me for evaluation  Below are my notes for this consultation  If you have questions, please do not hesitate to call me  I look forward to following your patient along with you  Sincerely,        Malcom Horowitz MD        CC: No Recipients  Malcom Horowitz MD  12/19/2018  1:39 PM  Sign at close encounter               Surgical Oncology Consult Note       [address] [facility]  1970  [id number]  [address] [facility]  [facility] SURGICAL ONCOLOGY [facility] [address]      Chief Complaint:     Chief Complaint   Patient presents with    Abnormal Mammogram     Pt is here for initial consultation for ADH found on routine screening mammo  She denies any symptoms  She does report that two paternal aunts had breast ca around age 48  Assessment and Plan:   Assessment/Plan   Patient presents with a new diagnosis of left breast atypical ductal hyperplasia  This has been reviewed by yumiko Martin  Given this diagnosis as well as her young age I have recommended a needle localization lumpectomy to exclude a neighboring undetected malignancy  Patient is agreeable to this approach  We subsequent to the process of informed consent for a left breast needle localization lumpectomy  The risk and benefit of the surgery were reviewed as outlined on the consent form  All questions were answered to the patient's satisfaction  We will coordinate the surgery next mutual convenience  Oncology History:      No history exists  History of Present Illness:    This is a 54-year-old woman who went for a screening mammogram which showed no abnormalities on the right side however on the left side was an area of calcifications Lucy Clements of biopsy  The patient subsequently had a stereotactic biopsy which demonstrated 2 small foci of probable atypical ductal hyperplasia  This was reviewed by yumiko Ko who confirmed that this was atypical ductal hyperplasia amongst microcalcifications  Patient presents now for an opinion regarding further management  The patient was diagnosed with tongue cancer treated with surgical resection and no additional treatment  She has a family history remarkable for 2 paternal aunts with breast cancer diagnosed in their 46s  Review of Systems:   Review of Systems   All other systems reviewed and are negative  Past Medical History:      Patient Active Problem List   Diagnosis    Acid reflux disease    Anxiety    Benign essential hypertension    Obesity    Vitamin D deficiency    Atypical ductal hyperplasia of left breast    Tongue cancer ([address] )    Anaphylaxis        Past Medical History:   Diagnosis Date    Anaphylaxis     Herpes zoster     last Assessed         Past Surgical History:   Procedure Laterality Date     SECTION      GLOSSECTOMY Bilateral      Complete with Uniliateral Radical Neck Disscetion    MAMMO STEREOTACTIC BREAST BIOPSY LEFT (ALL INC) Left 2018    TONGUE SURGERY      BX tongue location base        Family History   Problem Relation Age of Onset    Diabetes Mother     Heart disease Father     Skin cancer Father 61    Breast cancer Paternal Aunt 48    Stomach cancer Maternal Grandmother 80    Prostate cancer Maternal Grandfather 80    Breast cancer Paternal Aunt 48        Social History     Social History    Marital status: /Civil Union     Spouse name: N/A    Number of children: N/A    Years of education: N/A     Occupational History    Not on file       Social History Main Topics    Smoking status: Never Smoker    Smokeless tobacco: Former User    Alcohol use Yes      Comment: Social    Drug use: Unknown    Sexual activity: Not on file     Other Topics Concern    Not on file     Social History Narrative    Drinks Tea        Current Outpatient Prescriptions:     FLUoxetine (PROzac) 10 MG tablet, TAKE 1 TABLET BY MOUTH EVERY DAY, Disp: 90 tablet, Rfl: 0    lisinopril (ZESTRIL) 10 mg tablet, TAKE 1 TABLET BY MOUTH ONCE DAILY, Disp: 90 tablet, Rfl: 3    loratadine-pseudoephedrine (CLARITIN-D 24 HOUR)  mg per 24 hr tablet, Take 1 tablet by mouth daily, Disp: , Rfl:     naproxen sodium (ALEVE) 220 MG tablet, Take by mouth, Disp: , Rfl:     pantoprazole (PROTONIX) 40 mg tablet, TAKE 1 TABLET BY MOUTH EVERY DAY, Disp: 90 tablet, Rfl: 3    ranitidine (ZANTAC) 150 mg tablet, TAKE 1 TABLET BY MOUTH 2 TIMES A DAY, Disp: 180 tablet, Rfl: 0    EPINEPHrine (EPIPEN 2-VINCE) 0 3 mg/0 3 mL SOAJ, Inject 0 3 mL (0 3 mg total) into a muscle once for 1 dose, Disp: 0 3 mL, Rfl: 3     Allergies   Allergen Reactions    Penicillins Throat Swelling     Annotation - [id number]: as achild    Seasonal Ic [Cholestatin]     Sulfites Hives, Rash and Throat Swelling       Physical Exam:     Vitals:    12/19/18 1255   BP: 160/82   Pulse: 82   Resp: 16   Temp: 98 4 °F (36 9 °C)     Physical Exam   Constitutional: She is oriented to person, place, and time  She appears well-developed and well-nourished  HENT:   Head: Normocephalic and atraumatic  Mouth/Throat: Oropharynx is clear and moist    Eyes: Pupils are equal, round, and reactive to light  EOM are normal    Neck: Normal range of motion  Neck supple  No JVD present  No tracheal deviation present  No thyromegaly present  Cardiovascular: Normal rate, regular rhythm, normal heart sounds and intact distal pulses  Exam reveals no gallop and no friction rub  No murmur heard  Pulmonary/Chest: Effort normal and breath sounds normal  No respiratory distress  She has no wheezes  She has no rales     Examination of the right breast in the sitting and supine position demonstrate no skin changes nipple discharge dominant masses or axillary adenopathy  Examination of the left breast demonstrates a small biopsy site with a small underlying hematoma in the upper outer quadrant of the left breast   There are no other masses in the breast no worrisome skin findings no axillary supraclavicular adenopathy  Abdominal: Soft  She exhibits no distension and no mass  There is no hepatomegaly  There is no tenderness  There is no rebound and no guarding  Musculoskeletal: Normal range of motion  She exhibits no edema or tenderness  Lymphadenopathy:     She has no cervical adenopathy  Neurological: She is alert and oriented to person, place, and time  No cranial nerve deficit  Skin: Skin is warm and dry  No rash noted  No erythema  Psychiatric: She has a normal mood and affect  Her behavior is normal    Vitals reviewed  Results:   Pathology:  Small foci of atypical ductal hyperplasia  Imaging  I reviewed her mammograms and post biopsy films I concur with report  Discussion/Summary:   Given the young age as well as a diagnosis of atypical ductal hyperplasia I have recommended a needle localization lumpectomy to exclude an underlying malignancy  I did explain that I do not expect to find this however it would  if we did  Presuming this is just atypical ductal hyperplasia we would have a conversation her postoperative visit discussing elevated risk which she had had a preliminary discussion today  All questions were answered the patient's satisfaction  We went through the process of informed consent as outlined above  Advance Care Planning/Advance Directives:  I discussed the disease status, treatment plans and follow-up with the patient

## 2018-12-19 NOTE — PROGRESS NOTES
Surgical Oncology Consult Note       [address] [facility],[address] [place] [address] SURGICAL ONCOLOGY [address] [address]    Matthew Matthews  1970  [id number]  [address] SURGICAL ONCOLOGY [facility] [address]      Chief Complaint:     Chief Complaint   Patient presents with    Abnormal Mammogram     Pt is here for initial consultation for ADH found on routine screening mammo  She denies any symptoms  She does report that two paternal aunts had breast ca around age 48  Assessment and Plan:   Assessment/Plan   Patient presents with a new diagnosis of left breast atypical ductal hyperplasia  This has been reviewed by yumiko Martin  Given this diagnosis as well as her young age I have recommended a needle localization lumpectomy to exclude a neighboring undetected malignancy  Patient is agreeable to this approach  We subsequent to the process of informed consent for a left breast needle localization lumpectomy  The risk and benefit of the surgery were reviewed as outlined on the consent form  All questions were answered to the patient's satisfaction  We will coordinate the surgery next mutual convenience  Oncology History:      No history exists  History of Present Illness: This is a 68-year-old woman who went for a screening mammogram which showed no abnormalities on the right side however on the left side was an area of calcifications Tere Pee of biopsy  The patient subsequently had a stereotactic biopsy which demonstrated 2 small foci of probable atypical ductal hyperplasia  This was reviewed by yumiko Vega who confirmed that this was atypical ductal hyperplasia amongst microcalcifications  Patient presents now for an opinion regarding further management  The patient was diagnosed with tongue cancer treated with surgical resection and no additional treatment    She has a family history remarkable for 2 paternal aunts with breast cancer diagnosed in their 46s  Review of Systems:   Review of Systems   All other systems reviewed and are negative  Past Medical History:      Patient Active Problem List   Diagnosis    Acid reflux disease    Anxiety    Benign essential hypertension    Obesity    Vitamin D deficiency    Atypical ductal hyperplasia of left breast    Tongue cancer ([address] )    Anaphylaxis        Past Medical History:   Diagnosis Date    Anaphylaxis     Herpes zoster     last Assessed         Past Surgical History:   Procedure Laterality Date     SECTION      GLOSSECTOMY Bilateral      Complete with Uniliateral Radical Neck Disscetion    MAMMO STEREOTACTIC BREAST BIOPSY LEFT (ALL INC) Left 2018    TONGUE SURGERY      BX tongue location base        Family History   Problem Relation Age of Onset    Diabetes Mother     Heart disease Father     Skin cancer Father 61    Breast cancer Paternal Aunt 48    Stomach cancer Maternal Grandmother 80    Prostate cancer Maternal Grandfather 80    Breast cancer Paternal Aunt 48        Social History     Social History    Marital status: /Civil Union     Spouse name: N/A    Number of children: N/A    Years of education: N/A     Occupational History    Not on file       Social History Main Topics    Smoking status: Never Smoker    Smokeless tobacco: Former User    Alcohol use Yes      Comment: Social    Drug use: Unknown    Sexual activity: Not on file     Other Topics Concern    Not on file     Social History Narrative    Drinks Tea        Current Outpatient Prescriptions:     FLUoxetine (PROzac) 10 MG tablet, TAKE 1 TABLET BY MOUTH EVERY DAY, Disp: 90 tablet, Rfl: 0    lisinopril (ZESTRIL) 10 mg tablet, TAKE 1 TABLET BY MOUTH ONCE DAILY, Disp: 90 tablet, Rfl: 3    loratadine-pseudoephedrine (CLARITIN-D 24 HOUR)  mg per 24 hr tablet, Take 1 tablet by mouth daily, Disp: , Rfl:    naproxen sodium (ALEVE) 220 MG tablet, Take by mouth, Disp: , Rfl:     pantoprazole (PROTONIX) 40 mg tablet, TAKE 1 TABLET BY MOUTH EVERY DAY, Disp: 90 tablet, Rfl: 3    ranitidine (ZANTAC) 150 mg tablet, TAKE 1 TABLET BY MOUTH 2 TIMES A DAY, Disp: 180 tablet, Rfl: 0    EPINEPHrine (EPIPEN 2-VINCE) 0 3 mg/0 3 mL SOAJ, Inject 0 3 mL (0 3 mg total) into a muscle once for 1 dose, Disp: 0 3 mL, Rfl: 3     Allergies   Allergen Reactions    Penicillins Throat Swelling     Annotation - [id number]: as achild    Seasonal Ic [Cholestatin]     Sulfites Hives, Rash and Throat Swelling       Physical Exam:     Vitals:    12/19/18 1255   BP: 160/82   Pulse: 82   Resp: 16   Temp: 98 4 °F (36 9 °C)     Physical Exam   Constitutional: She is oriented to person, place, and time  She appears well-developed and well-nourished  HENT:   Head: Normocephalic and atraumatic  Mouth/Throat: Oropharynx is clear and moist    Eyes: Pupils are equal, round, and reactive to light  EOM are normal    Neck: Normal range of motion  Neck supple  No JVD present  No tracheal deviation present  No thyromegaly present  Cardiovascular: Normal rate, regular rhythm, normal heart sounds and intact distal pulses  Exam reveals no gallop and no friction rub  No murmur heard  Pulmonary/Chest: Effort normal and breath sounds normal  No respiratory distress  She has no wheezes  She has no rales  Examination of the right breast in the sitting and supine position demonstrate no skin changes nipple discharge dominant masses or axillary adenopathy  Examination of the left breast demonstrates a small biopsy site with a small underlying hematoma in the upper outer quadrant of the left breast   There are no other masses in the breast no worrisome skin findings no axillary supraclavicular adenopathy  Abdominal: Soft  She exhibits no distension and no mass  There is no hepatomegaly  There is no tenderness  There is no rebound and no guarding  Musculoskeletal: Normal range of motion  She exhibits no edema or tenderness  Lymphadenopathy:     She has no cervical adenopathy  Neurological: She is alert and oriented to person, place, and time  No cranial nerve deficit  Skin: Skin is warm and dry  No rash noted  No erythema  Psychiatric: She has a normal mood and affect  Her behavior is normal    Vitals reviewed  Results:   Pathology:  Small foci of atypical ductal hyperplasia  Imaging  I reviewed her mammograms and post biopsy films I concur with report  Discussion/Summary:   Given the young age as well as a diagnosis of atypical ductal hyperplasia I have recommended a needle localization lumpectomy to exclude an underlying malignancy  I did explain that I do not expect to find this however it would  if we did  Presuming this is just atypical ductal hyperplasia we would have a conversation her postoperative visit discussing elevated risk which she had had a preliminary discussion today  All questions were answered the patient's satisfaction  We went through the process of informed consent as outlined above  Advance Care Planning/Advance Directives:  I discussed the disease status, treatment plans and follow-up with the patient

## 2019-01-09 ENCOUNTER — OFFICE VISIT (OUTPATIENT)
Dept: FAMILY MEDICINE CLINIC | Facility: CLINIC | Age: 49
End: 2019-01-09
Payer: COMMERCIAL

## 2019-01-09 VITALS
WEIGHT: 193 LBS | BODY MASS INDEX: 34.2 KG/M2 | TEMPERATURE: 98 F | SYSTOLIC BLOOD PRESSURE: 132 MMHG | HEIGHT: 63 IN | DIASTOLIC BLOOD PRESSURE: 82 MMHG | RESPIRATION RATE: 16 BRPM | OXYGEN SATURATION: 99 % | HEART RATE: 87 BPM

## 2019-01-09 DIAGNOSIS — J01.00 ACUTE NON-RECURRENT MAXILLARY SINUSITIS: Primary | ICD-10-CM

## 2019-01-09 PROCEDURE — 99213 OFFICE O/P EST LOW 20 MIN: CPT | Performed by: FAMILY MEDICINE

## 2019-01-09 PROCEDURE — 3008F BODY MASS INDEX DOCD: CPT | Performed by: FAMILY MEDICINE

## 2019-01-09 RX ORDER — AZITHROMYCIN 250 MG/1
TABLET, FILM COATED ORAL
Qty: 6 TABLET | Refills: 0 | Status: SHIPPED | OUTPATIENT
Start: 2019-01-09 | End: 2019-01-13

## 2019-01-09 RX ORDER — PREDNISONE 10 MG/1
TABLET ORAL
Qty: 21 TABLET | Refills: 0 | Status: SHIPPED | OUTPATIENT
Start: 2019-01-09 | End: 2019-01-21

## 2019-01-09 NOTE — PROGRESS NOTES
Assessment/Plan:         Diagnoses and all orders for this visit:    Acute non-recurrent maxillary sinusitis  -     predniSONE 10 mg tablet; 4 tabx 2 days, 3 tabs x 2 days, 2 tabs x 2 days, 1 tab x 2 days  -     azithromycin (ZITHROMAX) 250 mg tablet; 2 tab x 1 day, 1 tab x 4 days          Subjective:      Patient ID: Governor Mg is a 51 y o  female  Earache    There is pain in the right ear  This is a recurrent problem  The current episode started 1 to 4 weeks ago  The problem occurs every few hours  The problem has been waxing and waning  There has been no fever  The fever has been present for less than 1 day  The pain is at a severity of 4/10  Associated symptoms include coughing, headaches and rhinorrhea  Pertinent negatives include no abdominal pain, diarrhea, ear discharge, hearing loss, neck pain or rash  She has tried NSAIDs for the symptoms  The treatment provided mild relief  There is no history of a chronic ear infection, hearing loss or a tympanostomy tube  The following portions of the patient's history were reviewed and updated as appropriate: allergies, current medications, past family history, past medical history, past social history, past surgical history and problem list     Review of Systems   HENT: Positive for ear pain and rhinorrhea  Negative for ear discharge and hearing loss  Respiratory: Positive for cough  Gastrointestinal: Negative for abdominal pain and diarrhea  Musculoskeletal: Negative for neck pain  Skin: Negative for rash  Neurological: Positive for headaches  Objective:      /82 (BP Location: Right arm, Patient Position: Sitting, Cuff Size: Standard)   Pulse 87   Temp 98 °F (36 7 °C)   Resp 16   Ht 5' 3" (1 6 m)   Wt 87 5 kg (193 lb)   SpO2 99%   BMI 34 19 kg/m²          Physical Exam   Constitutional: She appears well-developed and well-nourished  HENT:   Nose: Rhinorrhea and sinus tenderness present   Right sinus exhibits maxillary sinus tenderness  Left sinus exhibits maxillary sinus tenderness  Eyes: No scleral icterus  Cardiovascular: Normal rate and regular rhythm      Pulmonary/Chest: Effort normal and breath sounds normal

## 2019-01-16 ENCOUNTER — APPOINTMENT (OUTPATIENT)
Dept: LAB | Facility: CLINIC | Age: 49
End: 2019-01-16
Payer: COMMERCIAL

## 2019-01-16 ENCOUNTER — OFFICE VISIT (OUTPATIENT)
Dept: SURGICAL ONCOLOGY | Facility: CLINIC | Age: 49
End: 2019-01-16

## 2019-01-16 ENCOUNTER — OFFICE VISIT (OUTPATIENT)
Dept: LAB | Facility: CLINIC | Age: 49
End: 2019-01-16
Payer: COMMERCIAL

## 2019-01-16 VITALS
HEIGHT: 63 IN | DIASTOLIC BLOOD PRESSURE: 90 MMHG | RESPIRATION RATE: 16 BRPM | SYSTOLIC BLOOD PRESSURE: 140 MMHG | TEMPERATURE: 98.4 F | BODY MASS INDEX: 34.55 KG/M2 | HEART RATE: 75 BPM | WEIGHT: 195 LBS

## 2019-01-16 DIAGNOSIS — N60.92 ATYPICAL DUCTAL HYPERPLASIA OF LEFT BREAST: ICD-10-CM

## 2019-01-16 DIAGNOSIS — N60.92 ATYPICAL DUCTAL HYPERPLASIA OF LEFT BREAST: Primary | ICD-10-CM

## 2019-01-16 LAB
ALBUMIN SERPL BCP-MCNC: 3.8 G/DL (ref 3.5–5)
ALP SERPL-CCNC: 61 U/L (ref 46–116)
ALT SERPL W P-5'-P-CCNC: 33 U/L (ref 12–78)
ANION GAP SERPL CALCULATED.3IONS-SCNC: 7 MMOL/L (ref 4–13)
AST SERPL W P-5'-P-CCNC: 22 U/L (ref 5–45)
BASOPHILS # BLD AUTO: 0.02 THOUSANDS/ΜL (ref 0–0.1)
BASOPHILS NFR BLD AUTO: 0 % (ref 0–1)
BILIRUB SERPL-MCNC: 0.7 MG/DL (ref 0.2–1)
BUN SERPL-MCNC: 16 MG/DL (ref 5–25)
CALCIUM SERPL-MCNC: 9.2 MG/DL (ref 8.3–10.1)
CHLORIDE SERPL-SCNC: 101 MMOL/L (ref 100–108)
CO2 SERPL-SCNC: 32 MMOL/L (ref 21–32)
CREAT SERPL-MCNC: 0.77 MG/DL (ref 0.6–1.3)
EOSINOPHIL # BLD AUTO: 0.25 THOUSAND/ΜL (ref 0–0.61)
EOSINOPHIL NFR BLD AUTO: 3 % (ref 0–6)
ERYTHROCYTE [DISTWIDTH] IN BLOOD BY AUTOMATED COUNT: 13 % (ref 11.6–15.1)
GFR SERPL CREATININE-BSD FRML MDRD: 91 ML/MIN/1.73SQ M
GLUCOSE SERPL-MCNC: 74 MG/DL (ref 65–140)
HCT VFR BLD AUTO: 41.1 % (ref 34.8–46.1)
HGB BLD-MCNC: 13.3 G/DL (ref 11.5–15.4)
IMM GRANULOCYTES # BLD AUTO: 0.02 THOUSAND/UL (ref 0–0.2)
IMM GRANULOCYTES NFR BLD AUTO: 0 % (ref 0–2)
LYMPHOCYTES # BLD AUTO: 1.99 THOUSANDS/ΜL (ref 0.6–4.47)
LYMPHOCYTES NFR BLD AUTO: 25 % (ref 14–44)
MCH RBC QN AUTO: 29.6 PG (ref 26.8–34.3)
MCHC RBC AUTO-ENTMCNC: 32.4 G/DL (ref 31.4–37.4)
MCV RBC AUTO: 91 FL (ref 82–98)
MONOCYTES # BLD AUTO: 0.67 THOUSAND/ΜL (ref 0.17–1.22)
MONOCYTES NFR BLD AUTO: 9 % (ref 4–12)
NEUTROPHILS # BLD AUTO: 4.9 THOUSANDS/ΜL (ref 1.85–7.62)
NEUTS SEG NFR BLD AUTO: 63 % (ref 43–75)
NRBC BLD AUTO-RTO: 0 /100 WBCS
PLATELET # BLD AUTO: 280 THOUSANDS/UL (ref 149–390)
PMV BLD AUTO: 11.1 FL (ref 8.9–12.7)
POTASSIUM SERPL-SCNC: 3.5 MMOL/L (ref 3.5–5.3)
PROT SERPL-MCNC: 7.6 G/DL (ref 6.4–8.2)
RBC # BLD AUTO: 4.5 MILLION/UL (ref 3.81–5.12)
SODIUM SERPL-SCNC: 140 MMOL/L (ref 136–145)
WBC # BLD AUTO: 7.85 THOUSAND/UL (ref 4.31–10.16)

## 2019-01-16 PROCEDURE — 80053 COMPREHEN METABOLIC PANEL: CPT | Performed by: SURGERY

## 2019-01-16 PROCEDURE — PREOP: Performed by: NURSE PRACTITIONER

## 2019-01-16 PROCEDURE — 93005 ELECTROCARDIOGRAM TRACING: CPT

## 2019-01-16 PROCEDURE — 36415 COLL VENOUS BLD VENIPUNCTURE: CPT | Performed by: SURGERY

## 2019-01-16 PROCEDURE — 85025 COMPLETE CBC W/AUTO DIFF WBC: CPT | Performed by: SURGERY

## 2019-01-16 NOTE — PROGRESS NOTES
Surgical Oncology Follow Up       [address] [facility],6Th Floor  [facility] KIMMY Olea [address] [place] [address] [address] [place] [address]  1970  [id number]      Chief Complaint   Patient presents with    Pre-op Exam     Pt is her for pre op        Assessment/Plan:  1  Atypical ductal hyperplasia of left breast  - Complete PATs  - Surgery with Dr Lorena Hu    Discussion/Summary:  Patient is a 59-year-old female who was found to have an abnormal screening mammogram   She underwent a left diagnostic mammogram which revealed pleomorphic calcifications at 1:00 position  She underwent a stereotactic biopsy which revealed atypical ductal hyperplasia  She met with Dr Lorena Hu and has consented for a left breast needle localization lumpectomy  This is scheduled for January 29, 2019  She presents today for preoperative history and physical   She has not yet completed her preoperative testing  She has no new complaints today  Denies headaches, back pain, bone pain, chest pain, palpitations, cough, shortness of breath, abdominal pain  She notices no changes on self-breast exam   No worrisome findings on today's exam   She was treated for tongue cancer at the [facility] approximately 10 years ago with surgery alone  Assuming there are no concerns on preadmission testing, patient will proceed with surgery as planned  She was instructed to call with any new concerns or symptoms  All of her questions were answered  History of Present Illness:     -Interval History:  Patient presents today for a preoperative history and physical   She has no new complaints today  She is scheduled for a left breast needle local lumpectomy with Dr Lorena Hu on January 29, 2018  Review of Systems:  Review of Systems   Constitutional: Negative for activity change, appetite change, chills, fatigue, fever and unexpected weight change  HENT: Negative for trouble swallowing      Eyes: Negative for pain, redness and visual disturbance  Respiratory: Negative for cough, shortness of breath and wheezing  Cardiovascular: Negative for chest pain, palpitations and leg swelling  Gastrointestinal: Negative for abdominal pain, constipation, diarrhea, nausea and vomiting  Endocrine: Negative for cold intolerance and heat intolerance  Musculoskeletal: Positive for back pain (chronic)  Negative for arthralgias, gait problem and myalgias  Skin: Negative for color change and rash  Neurological: Negative for dizziness, syncope, light-headedness, numbness and headaches  Hematological: Negative for adenopathy  Psychiatric/Behavioral: Negative for agitation and confusion  All other systems reviewed and are negative        Patient Active Problem List   Diagnosis    Acid reflux disease    Anxiety    Benign essential hypertension    Obesity    Vitamin D deficiency    Atypical ductal hyperplasia of left breast    [facility]    Anaphylaxis    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 ([facility]    Scar condition and fibrosis of skin    Secondary and unspecified malignant neoplasm of lymph nodes of head, face, and neck     Past Medical History:   Diagnosis Date    Anaphylaxis     Herpes zoster     last Assessed      Past Surgical History:   Procedure Laterality Date     SECTION      GLOSSECTOMY Bilateral      Complete with Uniliateral Radical Neck Disscetion    MAMMO STEREOTACTIC BREAST BIOPSY LEFT (ALL INC) Left 2018    TONGUE SURGERY      BX tongue location base     Family History   Problem Relation Age of Onset    Diabetes Mother     Heart disease Father     Skin cancer Father 61    Breast cancer Paternal Aunt 48    Stomach cancer Maternal Grandmother 80    Prostate cancer Maternal Grandfather 80    Breast cancer Paternal Aunt 48     Social History     Social History    Marital status: /Civil Union     Spouse name: N/A    Number of children: N/A  Years of education: N/A     Occupational History    Not on file  Social History Main Topics    Smoking status: Never Smoker    Smokeless tobacco: Former User    Alcohol use Yes      Comment: Social    Drug use: Unknown    Sexual activity: Not on file     Other Topics Concern    Not on file     Social History Narrative    Drinks Tea       Current Outpatient Prescriptions:     FLUoxetine (PROzac) 10 MG tablet, TAKE 1 TABLET BY MOUTH EVERY DAY, Disp: 90 tablet, Rfl: 0    lisinopril (ZESTRIL) 10 mg tablet, TAKE 1 TABLET BY MOUTH ONCE DAILY, Disp: 90 tablet, Rfl: 3    loratadine-pseudoephedrine (CLARITIN-D 24 HOUR)  mg per 24 hr tablet, Take 1 tablet by mouth daily, Disp: , Rfl:     naproxen sodium (ALEVE) 220 MG tablet, Take by mouth, Disp: , Rfl:     oxyCODONE-acetaminophen (PERCOCET) 5-325 mg per tablet, Take 1 tablet by mouth every 4 (four) hours as needed for moderate pain Max Daily Amount: 6 tablets, Disp: 6 tablet, Rfl: 0    pantoprazole (PROTONIX) 40 mg tablet, TAKE 1 TABLET BY MOUTH EVERY DAY, Disp: 90 tablet, Rfl: 3    predniSONE 10 mg tablet, 4 tabx 2 days, 3 tabs x 2 days, 2 tabs x 2 days, 1 tab x 2 days, Disp: 21 tablet, Rfl: 0    ranitidine (ZANTAC) 150 mg tablet, TAKE 1 TABLET BY MOUTH 2 TIMES A DAY, Disp: 180 tablet, Rfl: 0    EPINEPHrine (EPIPEN 2-VINCE) 0 3 mg/0 3 mL SOAJ, Inject 0 3 mL (0 3 mg total) into a muscle once for 1 dose, Disp: 0 3 mL, Rfl: 3  Allergies   Allergen Reactions    Penicillins Throat Swelling     [facility] [place] - [id number]: as achild    Seasonal Ic [Cholestatin]     Sulfites Hives, Rash and Throat Swelling     Vitals:    01/16/19 1116   BP: 140/90   Pulse: 75   Resp: 16   Temp: 98 4 °F (36 9 °C)       Physical Exam   Constitutional: She is oriented to person, place, and time  Vital signs are normal  She appears well-developed and well-nourished  No distress  HENT:   Head: Normocephalic and atraumatic  Neck: Normal range of motion   Carotid bruit is not present  Bilateral neck incisions   Cardiovascular: Normal rate, regular rhythm and normal heart sounds  Pulmonary/Chest: Effort normal and breath sounds normal    Bilateral breasts were examined in the sitting and supine position  There are no masses, skin nodules, nipple changes or nipple discharge  Left breast well healed biopsy site  There is no bilateral supraclavicular or axillary lymphadenopathy noted  Abdominal: Soft  Normal appearance  She exhibits no mass  There is no hepatosplenomegaly  There is no tenderness  Musculoskeletal: Normal range of motion  Lymphadenopathy:     She has no axillary adenopathy  Right: No supraclavicular adenopathy present  Left: No supraclavicular adenopathy present  Neurological: She is alert and oriented to person, place, and time  Skin: Skin is warm, dry and intact  No rash noted  She is not diaphoretic  Psychiatric: She has a normal mood and affect  Her speech is normal    Vitals reviewed  Advance Care Planning/Advance Directives:  Discussed disease status and treatment goals with the patient

## 2019-01-17 LAB
ATRIAL RATE: 65 BPM
P AXIS: 26 DEGREES
PR INTERVAL: 134 MS
QRS AXIS: 31 DEGREES
QRSD INTERVAL: 74 MS
QT INTERVAL: 398 MS
QTC INTERVAL: 413 MS
T WAVE AXIS: 32 DEGREES
VENTRICULAR RATE: 65 BPM

## 2019-01-17 PROCEDURE — 93010 ELECTROCARDIOGRAM REPORT: CPT | Performed by: INTERNAL MEDICINE

## 2019-01-21 NOTE — PRE-PROCEDURE INSTRUCTIONS
Pre-Surgery Instructions:   Medication Instructions    FLUoxetine (PROzac) 10 MG tablet Instructed patient per Anesthesia Guidelines   lisinopril (ZESTRIL) 10 mg tablet Instructed patient per Anesthesia Guidelines   loratadine-pseudoephedrine (CLARITIN-D 24 HOUR)  mg per 24 hr tablet Instructed patient per Anesthesia Guidelines   naproxen sodium (ALEVE) 220 MG tablet Patient was instructed by Physician and understands   pantoprazole (PROTONIX) 40 mg tablet Instructed patient per Anesthesia Guidelines   ranitidine (ZANTAC) 150 mg tablet Instructed patient per Anesthesia Guidelines  Pre op and bathing instructions reviewed   Pt has hibiclens

## 2019-01-28 ENCOUNTER — ANESTHESIA EVENT (OUTPATIENT)
Dept: PERIOP | Facility: HOSPITAL | Age: 49
End: 2019-01-28
Payer: COMMERCIAL

## 2019-01-29 ENCOUNTER — ANESTHESIA (OUTPATIENT)
Dept: PERIOP | Facility: HOSPITAL | Age: 49
End: 2019-01-29
Payer: COMMERCIAL

## 2019-01-29 ENCOUNTER — HOSPITAL ENCOUNTER (OUTPATIENT)
Facility: HOSPITAL | Age: 49
Setting detail: OUTPATIENT SURGERY
Discharge: HOME | End: 2019-01-29
Attending: SURGERY | Admitting: SURGERY
Payer: COMMERCIAL

## 2019-01-29 ENCOUNTER — HOSPITAL ENCOUNTER (OUTPATIENT)
Dept: MAMMOGRAPHY | Facility: HOSPITAL | Age: 49
Discharge: HOME | End: 2019-01-29
Attending: SURGERY
Payer: COMMERCIAL

## 2019-01-29 ENCOUNTER — APPOINTMENT (OUTPATIENT)
Dept: MAMMOGRAPHY | Facility: HOSPITAL | Age: 49
End: 2019-01-29
Payer: COMMERCIAL

## 2019-01-29 VITALS
HEART RATE: 62 BPM | OXYGEN SATURATION: 99 % | TEMPERATURE: 97.6 F | BODY MASS INDEX: 33.66 KG/M2 | DIASTOLIC BLOOD PRESSURE: 68 MMHG | WEIGHT: 190 LBS | RESPIRATION RATE: 18 BRPM | SYSTOLIC BLOOD PRESSURE: 143 MMHG | HEIGHT: 63 IN

## 2019-01-29 DIAGNOSIS — N60.92 BENIGN MAMMARY DYSPLASIA OF LEFT BREAST: ICD-10-CM

## 2019-01-29 DIAGNOSIS — N60.92 ATYPICAL DUCTAL HYPERPLASIA OF LEFT BREAST: ICD-10-CM

## 2019-01-29 PROCEDURE — 88341 IMHCHEM/IMCYTCHM EA ADD ANTB: CPT | Performed by: PATHOLOGY

## 2019-01-29 PROCEDURE — 19301 PARTIAL MASTECTOMY: CPT | Performed by: SURGERY

## 2019-01-29 PROCEDURE — 88342 IMHCHEM/IMCYTCHM 1ST ANTB: CPT | Performed by: PATHOLOGY

## 2019-01-29 PROCEDURE — 19281 PERQ DEVICE BREAST 1ST IMAG: CPT

## 2019-01-29 PROCEDURE — 88307 TISSUE EXAM BY PATHOLOGIST: CPT | Performed by: PATHOLOGY

## 2019-01-29 RX ORDER — SCOLOPAMINE TRANSDERMAL SYSTEM 1 MG/1
PATCH, EXTENDED RELEASE TRANSDERMAL
Status: COMPLETED
Start: 2019-01-29 | End: 2019-01-29

## 2019-01-29 RX ORDER — ONDANSETRON 2 MG/ML
INJECTION INTRAMUSCULAR; INTRAVENOUS AS NEEDED
Status: DISCONTINUED | OUTPATIENT
Start: 2019-01-29 | End: 2019-01-29 | Stop reason: SURG

## 2019-01-29 RX ORDER — SODIUM CHLORIDE 9 MG/ML
125 INJECTION, SOLUTION INTRAVENOUS CONTINUOUS
Status: DISCONTINUED | OUTPATIENT
Start: 2019-01-29 | End: 2019-01-29 | Stop reason: HOSPADM

## 2019-01-29 RX ORDER — LIDOCAINE HYDROCHLORIDE 10 MG/ML
5 INJECTION, SOLUTION INFILTRATION; PERINEURAL ONCE
Status: COMPLETED | OUTPATIENT
Start: 2019-01-29 | End: 2019-01-29

## 2019-01-29 RX ORDER — CLINDAMYCIN PHOSPHATE 900 MG/50ML
900 INJECTION INTRAVENOUS ONCE
Status: COMPLETED | OUTPATIENT
Start: 2019-01-29 | End: 2019-01-29

## 2019-01-29 RX ORDER — MAGNESIUM HYDROXIDE 1200 MG/15ML
LIQUID ORAL AS NEEDED
Status: DISCONTINUED | OUTPATIENT
Start: 2019-01-29 | End: 2019-01-29 | Stop reason: HOSPADM

## 2019-01-29 RX ORDER — SCOLOPAMINE TRANSDERMAL SYSTEM 1 MG/1
PATCH, EXTENDED RELEASE TRANSDERMAL AS NEEDED
Status: DISCONTINUED | OUTPATIENT
Start: 2019-01-29 | End: 2019-01-29 | Stop reason: SURG

## 2019-01-29 RX ORDER — FENTANYL CITRATE/PF 50 MCG/ML
25 SYRINGE (ML) INJECTION
Status: DISCONTINUED | OUTPATIENT
Start: 2019-01-29 | End: 2019-01-29 | Stop reason: HOSPADM

## 2019-01-29 RX ORDER — BUPIVACAINE HYDROCHLORIDE AND EPINEPHRINE 5; 5 MG/ML; UG/ML
INJECTION, SOLUTION EPIDURAL; INTRACAUDAL; PERINEURAL AS NEEDED
Status: DISCONTINUED | OUTPATIENT
Start: 2019-01-29 | End: 2019-01-29 | Stop reason: HOSPADM

## 2019-01-29 RX ORDER — PROPOFOL 10 MG/ML
INJECTION, EMULSION INTRAVENOUS AS NEEDED
Status: DISCONTINUED | OUTPATIENT
Start: 2019-01-29 | End: 2019-01-29 | Stop reason: SURG

## 2019-01-29 RX ORDER — ONDANSETRON 2 MG/ML
4 INJECTION INTRAMUSCULAR; INTRAVENOUS ONCE AS NEEDED
Status: DISCONTINUED | OUTPATIENT
Start: 2019-01-29 | End: 2019-01-29 | Stop reason: HOSPADM

## 2019-01-29 RX ORDER — LIDOCAINE HYDROCHLORIDE 10 MG/ML
INJECTION, SOLUTION INFILTRATION; PERINEURAL AS NEEDED
Status: DISCONTINUED | OUTPATIENT
Start: 2019-01-29 | End: 2019-01-29 | Stop reason: SURG

## 2019-01-29 RX ORDER — FENTANYL CITRATE 50 UG/ML
INJECTION, SOLUTION INTRAMUSCULAR; INTRAVENOUS AS NEEDED
Status: DISCONTINUED | OUTPATIENT
Start: 2019-01-29 | End: 2019-01-29 | Stop reason: SURG

## 2019-01-29 RX ORDER — MIDAZOLAM HYDROCHLORIDE 1 MG/ML
INJECTION INTRAMUSCULAR; INTRAVENOUS AS NEEDED
Status: DISCONTINUED | OUTPATIENT
Start: 2019-01-29 | End: 2019-01-29 | Stop reason: SURG

## 2019-01-29 RX ORDER — OXYCODONE HYDROCHLORIDE AND ACETAMINOPHEN 5; 325 MG/1; MG/1
1 TABLET ORAL EVERY 4 HOURS PRN
Status: DISCONTINUED | OUTPATIENT
Start: 2019-01-29 | End: 2019-01-29 | Stop reason: HOSPADM

## 2019-01-29 RX ADMIN — LIDOCAINE HYDROCHLORIDE ANHYDROUS 5 ML: 10 INJECTION, SOLUTION INFILTRATION at 11:48

## 2019-01-29 RX ADMIN — DEXAMETHASONE SODIUM PHOSPHATE 4 MG: 10 INJECTION INTRAMUSCULAR; INTRAVENOUS at 12:46

## 2019-01-29 RX ADMIN — CLINDAMYCIN PHOSPHATE 900 MG: 18 INJECTION, SOLUTION INTRAMUSCULAR; INTRAVENOUS at 12:30

## 2019-01-29 RX ADMIN — FENTANYL CITRATE 50 MCG: 50 INJECTION INTRAMUSCULAR; INTRAVENOUS at 12:49

## 2019-01-29 RX ADMIN — PROPOFOL 40 MG: 10 INJECTION, EMULSION INTRAVENOUS at 13:04

## 2019-01-29 RX ADMIN — FENTANYL CITRATE 50 MCG: 50 INJECTION INTRAMUSCULAR; INTRAVENOUS at 13:04

## 2019-01-29 RX ADMIN — SCOPALAMINE 1 PATCH: 1 PATCH, EXTENDED RELEASE TRANSDERMAL at 11:20

## 2019-01-29 RX ADMIN — SODIUM CHLORIDE: 0.9 INJECTION, SOLUTION INTRAVENOUS at 13:04

## 2019-01-29 RX ADMIN — PROPOFOL 200 MG: 10 INJECTION, EMULSION INTRAVENOUS at 12:39

## 2019-01-29 RX ADMIN — SODIUM CHLORIDE: 0.9 INJECTION, SOLUTION INTRAVENOUS at 11:55

## 2019-01-29 RX ADMIN — MIDAZOLAM HYDROCHLORIDE 2 MG: 1 INJECTION, SOLUTION INTRAMUSCULAR; INTRAVENOUS at 12:30

## 2019-01-29 RX ADMIN — ONDANSETRON 4 MG: 2 INJECTION INTRAMUSCULAR; INTRAVENOUS at 12:46

## 2019-01-29 RX ADMIN — LIDOCAINE HYDROCHLORIDE ANHYDROUS 50 MG: 10 INJECTION, SOLUTION INFILTRATION at 12:39

## 2019-01-29 NOTE — ANESTHESIA POSTPROCEDURE EVALUATION
Post-Op Assessment Note      CV Status:  Stable    Mental Status:  Alert and awake    Hydration Status:  Stable    PONV Controlled:  None    Airway Patency:  Patent        Staff: CRNA           BP   130/74   Temp   97 4   Pulse  60   Resp   16   SpO2   100

## 2019-01-29 NOTE — ANESTHESIA PREPROCEDURE EVALUATION
Review of Systems/Medical History  Patient summary reviewed  Chart reviewed  History of anesthetic complications PONV    Cardiovascular  Exercise tolerance (METS): >4,  Hypertension ,    Pulmonary  Negative pulmonary ROS        GI/Hepatic    PUD, GERD well controlled,        Negative  ROS        Endo/Other    Comment: Hx tongue cancer treated with surgical resection only 10 years ago Obesity (BMI 34)    GYN      Comment: Atypical ductal hyperplasia of left breast      Hematology  Negative hematology ROS      Musculoskeletal  Negative musculoskeletal ROS        Neurology  Negative neurology ROS      Psychology   Anxiety,            Physical Exam    Airway  Comment: Very abnormal tongue due to resection - large anterior portion of tongue, fixed  Mallampati score: III  TM Distance: >3 FB  Neck ROM: full     Dental   Comment: Prominent incisors, No notable dental hx     Cardiovascular      Pulmonary      Other Findings       Lab Results   Component Value Date    WBC 7 85 01/16/2019    HGB 13 3 01/16/2019     01/16/2019     Lab Results   Component Value Date     01/16/2019    K 3 5 01/16/2019    BUN 16 01/16/2019    CREATININE 0 77 01/16/2019     Anesthesia Plan  ASA Score- 2     Anesthesia Type- general with ASA Monitors  Additional Monitors:   Airway Plan: LMA  Comment: Scop patch placed, multimodal PONV ppx  Anticipate possible difficult airway - suspect LMA will seat normally however since her surgery was only on her anterior tongue  Pt reports she has not been intubated since her resection        Plan Factors-    Induction- intravenous  Postoperative Plan-     Informed Consent- Anesthetic plan and risks discussed with patient, spouse and daughter  I personally reviewed this patient with the CRNA  Discussed and agreed on the Anesthesia Plan with the CRNA  Alberto Witt

## 2019-01-29 NOTE — OP NOTE
OPERATIVE REPORT  PATIENT NAME: Katerine Aparicio    :  1970  MRN: [id number]  Pt Location: AN OR ROOM 02    SURGERY DATE: 2019    Surgeon(s) and Role:     * Melany Garza MD - Primary    Preop Diagnosis:  Atypical ductal hyperplasia of left breast [N60 92]    Post-Op Diagnosis Codes:     * Atypical ductal hyperplasia of left breast [N60 92]    Procedure(s) (LRB):  BREAST LUMPECTOMY; BREAST NEEDLE LOCALIZATION (NEEDLE LOC AT 1130) (Left)    Specimen(s):  ID Type Source Tests Collected by Time Destination   1 : left breast lumpectomy Tissue Breast, Left TISSUE EXAM Melany Garza MD 2019 1256        Estimated Blood Loss:   Minimal    Drains:       Anesthesia Type:   General    Operative Indications:  Atypical ductal hyperplasia of left breast [N60 92]      Operative Findings:  Good specimen radiograph    Complications:   None    Procedure and Technique:  I previously reviewed the needle localization images  Lumpectomy  The patient was brought to the operation room and placed under general anesthesia  Attention was paid to ensure appropriate padding and correct positioning  The left breast was prepped and draped in a sterile fashion  I initiated a time-out, identifying the patient, the correct side and the above procedure  All parties agreed with the time out  The planned incision was then injected with 0 25% Marcaine and epinephrine for local anesthesia  The incision was sharply incised  The localizing wire was used to guide the dissection  Superior, inferior, medial and lateral planes were developed around the localizing wire and the specimen truncated posteriorly with electrocautery just beyond the tip of the wire  The specimen was then inked for orientation purposes  A specimen radiograph was taken in two dimensions which showed good postioning  The specimen was sent to pathology for permanent analysis    Superficial bleeding controlled with electrocautery and the wound was extensively irrigated  The wound was closed with two layers, an inner layer of 3-0 vicryl and a subcuticular layer of 4-0 monocryl  Steri-strips were applied     I was present for the entire procedure    Patient Disposition:  PACU     SIGNATURE: Porsha Torres MD  DATE: January 29, 2019  TIME: 1:16 PM

## 2019-01-29 NOTE — H&P (VIEW-ONLY)
Surgical Oncology Follow Up       [address] [facility],6[address]  [facility] KIMMY Olea [address] [place] [address] [address] [place] [address]  1970  [id number]      Chief Complaint   Patient presents with    Pre-op Exam     Pt is her for pre op        Assessment/Plan:  1  Atypical ductal hyperplasia of left breast  - Complete PATs  - Surgery with Dr Raj Mg    Discussion/Summary:  Patient is a 11-year-old female who was found to have an abnormal screening mammogram   She underwent a left diagnostic mammogram which revealed pleomorphic calcifications at 1:00 position  She underwent a stereotactic biopsy which revealed atypical ductal hyperplasia  She met with Dr Raj Mg and has consented for a left breast needle localization lumpectomy  This is scheduled for January 29, 2019  She presents today for preoperative history and physical   She has not yet completed her preoperative testing  She has no new complaints today  Denies headaches, back pain, bone pain, chest pain, palpitations, cough, shortness of breath, abdominal pain  She notices no changes on self-breast exam   No worrisome findings on today's exam   She was treated for tongue cancer at the [facility] approximately 10 years ago with surgery alone  Assuming there are no concerns on preadmission testing, patient will proceed with surgery as planned  She was instructed to call with any new concerns or symptoms  All of her questions were answered  History of Present Illness:     -Interval History:  Patient presents today for a preoperative history and physical   She has no new complaints today  She is scheduled for a left breast needle local lumpectomy with Dr Raj Mg on January 29, 2018  Review of Systems:  Review of Systems   Constitutional: Negative for activity change, appetite change, chills, fatigue, fever and unexpected weight change  HENT: Negative for trouble swallowing      Eyes: Negative for pain, redness and visual disturbance  Respiratory: Negative for cough, shortness of breath and wheezing  Cardiovascular: Negative for chest pain, palpitations and leg swelling  Gastrointestinal: Negative for abdominal pain, constipation, diarrhea, nausea and vomiting  Endocrine: Negative for cold intolerance and heat intolerance  Musculoskeletal: Positive for back pain (chronic)  Negative for arthralgias, gait problem and myalgias  Skin: Negative for color change and rash  Neurological: Negative for dizziness, syncope, light-headedness, numbness and headaches  Hematological: Negative for adenopathy  Psychiatric/Behavioral: Negative for agitation and confusion  All other systems reviewed and are negative        Patient Active Problem List   Diagnosis    Acid reflux disease    Anxiety    Benign essential hypertension    Obesity    Vitamin D deficiency    Atypical ductal hyperplasia of left breast    [facility]ue cancer ([facility] [address] [facility]    Anaphylaxis    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 ([facility]    Scar condition and fibrosis of skin    Secondary and unspecified malignant neoplasm of lymph nodes of head, face, and neck     Past Medical History:   Diagnosis Date    Anaphylaxis     Herpes zoster     last Assessed      Past Surgical History:   Procedure Laterality Date     SECTION      GLOSSECTOMY Bilateral      Complete with Uniliateral Radical Neck Disscetion    MAMMO STEREOTACTIC BREAST BIOPSY LEFT (ALL INC) Left 2018    TONGUE SURGERY      BX tongue location base     Family History   Problem Relation Age of Onset    Diabetes Mother     Heart disease Father     Skin cancer Father 61    Breast cancer Paternal Aunt 48    Stomach cancer Maternal Grandmother 80    Prostate cancer Maternal Grandfather 80    Breast cancer Paternal Aunt 48     Social History     Social History    Marital status: /Civil Union     Spouse name: N/A    Number of children: N/A  Years of education: N/A     Occupational History    Not on file  Social History Main Topics    Smoking status: Never Smoker    Smokeless tobacco: Former User    Alcohol use Yes      Comment: Social    Drug use: Unknown    Sexual activity: Not on file     Other Topics Concern    Not on file     Social History Narrative    Drinks Tea       Current Outpatient Prescriptions:     FLUoxetine (PROzac) 10 MG tablet, TAKE 1 TABLET BY MOUTH EVERY DAY, Disp: 90 tablet, Rfl: 0    lisinopril (ZESTRIL) 10 mg tablet, TAKE 1 TABLET BY MOUTH ONCE DAILY, Disp: 90 tablet, Rfl: 3    loratadine-pseudoephedrine (CLARITIN-D 24 HOUR)  mg per 24 hr tablet, Take 1 tablet by mouth daily, Disp: , Rfl:     naproxen sodium (ALEVE) 220 MG tablet, Take by mouth, Disp: , Rfl:     oxyCODONE-acetaminophen (PERCOCET) 5-325 mg per tablet, Take 1 tablet by mouth every 4 (four) hours as needed for moderate pain Max Daily Amount: 6 tablets, Disp: 6 tablet, Rfl: 0    pantoprazole (PROTONIX) 40 mg tablet, TAKE 1 TABLET BY MOUTH EVERY DAY, Disp: 90 tablet, Rfl: 3    predniSONE 10 mg tablet, 4 tabx 2 days, 3 tabs x 2 days, 2 tabs x 2 days, 1 tab x 2 days, Disp: 21 tablet, Rfl: 0    ranitidine (ZANTAC) 150 mg tablet, TAKE 1 TABLET BY MOUTH 2 TIMES A DAY, Disp: 180 tablet, Rfl: 0    EPINEPHrine (EPIPEN 2-VINCE) 0 3 mg/0 3 mL SOAJ, Inject 0 3 mL (0 3 mg total) into a muscle once for 1 dose, Disp: 0 3 mL, Rfl: 3  Allergies   Allergen Reactions    Penicillins Throat Swelling     [facility] [id number]: as achild    Seasonal Ic [Cholestatin]     Sulfites Hives, Rash and Throat Swelling     Vitals:    01/16/19 1116   BP: 140/90   Pulse: 75   Resp: 16   Temp: 98 4 °F (36 9 °C)       Physical Exam   Constitutional: She is oriented to person, place, and time  Vital signs are normal  She appears well-developed and well-nourished  No distress  HENT:   Head: Normocephalic and atraumatic  Neck: Normal range of motion   Carotid bruit is not present  Bilateral neck incisions   Cardiovascular: Normal rate, regular rhythm and normal heart sounds  Pulmonary/Chest: Effort normal and breath sounds normal    Bilateral breasts were examined in the sitting and supine position  There are no masses, skin nodules, nipple changes or nipple discharge  Left breast well healed biopsy site  There is no bilateral supraclavicular or axillary lymphadenopathy noted  Abdominal: Soft  Normal appearance  She exhibits no mass  There is no hepatosplenomegaly  There is no tenderness  Musculoskeletal: Normal range of motion  Lymphadenopathy:     She has no axillary adenopathy  Right: No supraclavicular adenopathy present  Left: No supraclavicular adenopathy present  Neurological: She is alert and oriented to person, place, and time  Skin: Skin is warm, dry and intact  No rash noted  She is not diaphoretic  Psychiatric: She has a normal mood and affect  Her speech is normal    Vitals reviewed  Advance Care Planning/Advance Directives:  Discussed disease status and treatment goals with the patient

## 2019-01-29 NOTE — DISCHARGE INSTRUCTIONS
POST-OPERATIVE BREAST CARE INSTRUCTIONS    Wound Closure/Dressing: Your wound is closed with:   Steri strips-white pieces of tape that hold your incision together along with surgical glue           Dissolvable sutures-underneath the skin    Wound care:     If you have gauze over your wound you may remove it the day after surgery  Leave the Steri-strips on, they will fall off on their own in 1-2 weeks   You may shower using soap and water to clean your wound  Gently pat dry  Drain Care: If you do not have a drain, disregard this section   Visiting Nursing should have been arranged upon discharge from hospital   A nurse will call your home to schedule an initial visit  Please call the number below if you have not received a call within 48 hours of hospital discharge   You may shower with the JOSE GUADALUPE drains  Wash area around the drains with soap and water and pat dry   Record drain outputs on chart given to you at pre op visit and bring that chart to office at post op appt   JOSE GUADALUPE drains can be removed in the office by a nurse either at post op visit OR when drain output is 30 ccs or less in a 24 hour period for three days in a row   If you had plastic surgery or reconstructive surgery, the plastic surgeon will manage the drains  Other:       You can begin wearing your own bra when it feels comfortable   You may resume using deodorant the day after surgery                 Post-op visit:  your post-op visit is scheduled for:  2019 @ 9:00 AM    Call our office at [phone] if you have any  of the followin  Redness, swelling, heat, unusual drainage or heavy bleeding from wound  2  Fever greater than 101 °  3  Pain not relieved by medication

## 2019-01-31 DIAGNOSIS — N95.9 PERIMENOPAUSAL DISORDER: ICD-10-CM

## 2019-01-31 RX ORDER — FLUOXETINE 10 MG/1
TABLET, FILM COATED ORAL
Qty: 90 TABLET | Refills: 0 | Status: SHIPPED | OUTPATIENT
Start: 2019-01-31 | End: 2019-05-05 | Stop reason: SDUPTHER

## 2019-02-08 ENCOUNTER — ANNUAL EXAM (OUTPATIENT)
Dept: FAMILY MEDICINE CLINIC | Facility: CLINIC | Age: 49
End: 2019-02-08
Payer: COMMERCIAL

## 2019-02-08 VITALS
HEART RATE: 66 BPM | BODY MASS INDEX: 34.2 KG/M2 | TEMPERATURE: 98 F | OXYGEN SATURATION: 99 % | SYSTOLIC BLOOD PRESSURE: 134 MMHG | HEIGHT: 63 IN | DIASTOLIC BLOOD PRESSURE: 82 MMHG | RESPIRATION RATE: 16 BRPM | WEIGHT: 193 LBS

## 2019-02-08 DIAGNOSIS — Z12.4 SCREENING FOR CERVICAL CANCER: ICD-10-CM

## 2019-02-08 DIAGNOSIS — Z12.4 PAP SMEAR FOR CERVICAL CANCER SCREENING: Primary | ICD-10-CM

## 2019-02-08 PROCEDURE — G0145 SCR C/V CYTO,THINLAYER,RESCR: HCPCS | Performed by: FAMILY MEDICINE

## 2019-02-08 PROCEDURE — 99396 PREV VISIT EST AGE 40-64: CPT | Performed by: FAMILY MEDICINE

## 2019-02-08 NOTE — PROGRESS NOTES
Subjective     Rodo Velazquez is a 51 y o  female here for a routine exam   Current complaints: none   Personal health questionnaire reviewed: yes  Gynecologic History  No LMP recorded  Patient is postmenopausal   Contraception: none  Last Pap: 2-3 years ago  Results were: normal  Last mammogram: 19  Results were: abnormal    Obstetric History  OB History    Para Term  AB Living   2 1 1         SAB TAB Ectopic Multiple Live Births           1      # Outcome Date GA Lbr Matias/2nd Weight Sex Delivery Anes PTL Lv   2             1 Term               Obstetric Comments   Menarche at age 14   First child at age 38         The following portions of the patient's history were reviewed and updated as appropriate: allergies, current medications, past family history, past medical history, past social history, past surgical history and problem list     Review of Systems  Pertinent items are noted in HPI        Objective     /82 (BP Location: Left arm, Patient Position: Sitting, Cuff Size: Standard)   Pulse 66   Temp 98 °F (36 7 °C)   Resp 16   Ht 5' 3" (1 6 m)   Wt 87 5 kg (193 lb)   SpO2 99%   BMI 34 19 kg/m²     General Appearance:    Alert, cooperative, no distress, appears stated age   Head:    Normocephalic, without obvious abnormality, atraumatic   Eyes:    PERRL, conjunctiva/corneas clear, EOM's intact, fundi     benign, both eyes   Ears:    Normal TM's and external ear canals, both ears   Nose:   Nares normal, septum midline, mucosa normal, no drainage    or sinus tenderness   Throat:   Lips, mucosa, and tongue normal; teeth and gums normal   Neck:   Supple, symmetrical, trachea midline, no adenopathy;     thyroid:  no enlargement/tenderness/nodules; no carotid    bruit or JVD   Back:     Symmetric, no curvature, ROM normal, no CVA tenderness   Lungs:     Clear to auscultation bilaterally, respirations unlabored   Chest Wall:    No tenderness or deformity    Heart:    Regular rate and rhythm, S1 and S2 normal, no murmur, rub   or gallop   Breast Exam:    No tenderness, masses, or nipple abnormality   Abdomen:     Soft, non-tender, bowel sounds active all four quadrants,     no masses, no organomegaly   Genitalia:    Normal female without lesion, discharge or tenderness       Extremities:   Extremities normal, atraumatic, no cyanosis or edema   Pulses:   2+ and symmetric all extremities   Skin:   Skin color, texture, turgor normal, no rashes or lesions   Lymph nodes:   Cervical, supraclavicular, and axillary nodes normal   Neurologic:   CNII-XII intact, normal strength, sensation and reflexes     throughout         Assessment     Healthy female exam     Keith was seen today for gynecologic exam     Diagnoses and all orders for this visit:    Pap smear for cervical cancer screening    Screening for cervical cancer  -     Liquid-based pap, screening      Plan     Follow up in: 1 year

## 2019-02-11 ENCOUNTER — OFFICE VISIT (OUTPATIENT)
Dept: SURGICAL ONCOLOGY | Facility: CLINIC | Age: 49
End: 2019-02-11

## 2019-02-11 VITALS
TEMPERATURE: 98 F | HEART RATE: 80 BPM | HEIGHT: 64 IN | SYSTOLIC BLOOD PRESSURE: 120 MMHG | WEIGHT: 199 LBS | DIASTOLIC BLOOD PRESSURE: 70 MMHG | RESPIRATION RATE: 16 BRPM | BODY MASS INDEX: 33.97 KG/M2

## 2019-02-11 DIAGNOSIS — N60.92 ATYPICAL DUCTAL HYPERPLASIA OF LEFT BREAST: Primary | ICD-10-CM

## 2019-02-11 PROCEDURE — 99024 POSTOP FOLLOW-UP VISIT: CPT | Performed by: SURGERY

## 2019-02-11 NOTE — LETTER
February 11, 2019     Josue Nguyen MD  [address]  [address]  [address] [place] [address]    Patient: Mehnaz Orellana   YOB: 1970   Date of Visit: 2/11/2019       Dear Dr Nery Bansal:    Thank you for referring Mehnaz Orellana to me for evaluation  Below are my notes for this consultation  If you have questions, please do not hesitate to call me  I look forward to following your patient along with you  Sincerely,        Otoniel Mcgrath MD        CC: No Recipients  Otoniel Mcgrath MD  2/11/2019  9:47 AM  Sign at close encounter     Surgical Oncology Breast Post-Op       [address] [facility]  1970  [id number]  [address] [facility] [facility]  [facility] [place]  [address] [facility] [address]    Chief Complaint:   Mary Lainez is seen for a post-operative visit of her recent left breast surgery  Oncology History:      No history exists  Left breast few foci of atypical ductal hyperplasia, elevated risk  Assessment & Plan:   Assessment/Plan    Mary Lainez presents for a postoperative visit  Her biopsy confirmed atypical ductal hyperplasia  History of Present Illness:   See above    Interval History:   The patient tolerated her surgery well  She has no complaints referable to her surgery  Her wound is healing well  Review of Systems:   Review of Systems   All other systems reviewed and are negative        Past Medical History:     Patient Active Problem List   Diagnosis    Acid reflux disease    Anxiety    Benign essential hypertension    Obesity    Vitamin D deficiency    Atypical ductal hyperplasia of left breast    Tongue cancer ([address]    Anaphylaxis    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 ([address] )    Scar condition and fibrosis of skin    Secondary and unspecified malignant neoplasm of lymph nodes of head, face, and neck     Past Medical History:   Diagnosis Date    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    Anxiety     Cancer [facility] )     tongue     GERD (gastroesophageal reflux disease)     Herpes zoster     last Assessed 2016    Hypertension      Past Surgical History:   Procedure Laterality Date    BREAST LUMPECTOMY Left 2019    Procedure: BREAST LUMPECTOMY; BREAST NEEDLE LOCALIZATION (NEEDLE LOC AT 1130);   Surgeon: Renzo Joshi MD;  [facility];  Service: Surgical Oncology     SECTION      GLOSSECTOMY Bilateral      Complete with Uniliateral Radical Neck Disscetion    MAMMO NEEDLE LOCALIZATION LEFT (ALL INC) Left 2019    MAMMO STEREOTACTIC BREAST BIOPSY LEFT (ALL INC) Left 2018    TONGUE SURGERY      BX tongue location base     Family History   Problem Relation Age of Onset    Diabetes Mother     Heart disease Father     Skin cancer Father 61    Breast cancer Paternal Aunt 48    Stomach cancer Maternal Grandmother 80    Prostate cancer Maternal Grandfather 80    Breast cancer Paternal Aunt 48     Social History     Socioeconomic History    Marital status: /Civil Union     Spouse name: Not on file    Number of children: Not on file    Years of education: Not on file    Highest education level: Not on file   Occupational History    Not on file   Social Needs    Financial resource strain: Not on file    Food insecurity:     Worry: Not on file     Inability: Not on file    Transportation needs:     Medical: Not on file     Non-medical: Not on file   Tobacco Use    Smoking status: Never Smoker    Smokeless tobacco: Never Used   Substance and Sexual Activity    Alcohol use: Yes     Comment: Social    Drug use: No    Sexual activity: Not on file   Lifestyle    Physical activity:     Days per week: Not on file     Minutes per session: Not on file    Stress: Not on file   Relationships    Social connections:     Talks on phone: Not on file     Gets together: Not on file Attends Mormon service: Not on file     Active member of club or organization: Not on file     Attends meetings of clubs or organizations: Not on file     Relationship status: Not on file    Intimate partner violence:     Fear of current or ex partner: Not on file     Emotionally abused: Not on file     Physically abused: Not on file     Forced sexual activity: Not on file   Other Topics Concern    Not on file   Social History Narrative    Drinks Tea       Current Outpatient Medications:     FLUoxetine (PROzac) 10 MG tablet, TAKE 1 TABLET BY MOUTH EVERY DAY, Disp: 90 tablet, Rfl: 0    lisinopril (ZESTRIL) 10 mg tablet, TAKE 1 TABLET BY MOUTH ONCE DAILY, Disp: 90 tablet, Rfl: 3    loratadine-pseudoephedrine (CLARITIN-D 24 HOUR)  mg per 24 hr tablet, Take 1 tablet by mouth as needed  , Disp: , Rfl:     naproxen sodium (ALEVE) 220 MG tablet, Take by mouth, Disp: , Rfl:     pantoprazole (PROTONIX) 40 mg tablet, TAKE 1 TABLET BY MOUTH EVERY DAY, Disp: 90 tablet, Rfl: 3    EPINEPHrine (EPIPEN 2-VINCE) 0 3 mg/0 3 mL SOAJ, Inject 0 3 mL (0 3 mg total) into a muscle once for 1 dose, Disp: 0 3 mL, Rfl: 3  Allergies   Allergen Reactions    Penicillins Throat Swelling     Annotation - [id number]: as achild    Seasonal Ic [Cholestatin]     Sulfites Hives, Rash and Throat Swelling       Physical Exams:     Vitals:    02/11/19 0901   BP: 120/70   Pulse: 80   Resp: 16   Temp: 98 °F (36 7 °C)     Physical Exam   Pulmonary/Chest:   Examination of the left breast demonstrates a well-healed incision  There is no evidence of infection  Results:       Labs:       Discussion/Summary:   The patient's pathology shows no evidence of malignancy however the atypia does place her at elevated risk  I explained the patient that the tell acoustic model estimates her risk of around 49% though this calculator was shown to significantly over estimate risk for atypia    However because she has several foci the [facility] model would also place her in the category of having annual MRIs  I recommended she have an MRI approximately 1 month prior to seeing us at our six-month follow-up visit to allow her breast to heal   We will coordinate this for her  I have recommended clinical breast exams twice a year in addition to standard mammography in annual MRIs  All questions were answered the patient's satisfaction

## 2019-02-11 NOTE — LETTER
February 11, 2019     No Recipients    Patient: Yasmin Flores   YOB: 1970   Date of Visit: 2/11/2019       Dear Dr Raj Humphries Recipients: Thank you for referring Yasmin Flores to me for evaluation  Below are my notes for this consultation  If you have questions, please do not hesitate to call me  I look forward to following your patient along with you  Sincerely,        Ciro Chu MD        CC: No Recipients  Ciro Chu MD  2/11/2019  9:47 AM  Sign at close encounter     Surgical Oncology Breast Post-Op       [address] [place] [address]  1970  [id number]  [address] [facility],[address] [facility]  [facility] [place] [address]    Chief Complaint:   Elizabeth Mera is seen for a post-operative visit of her recent left breast surgery  Oncology History:      No history exists  Left breast few foci of atypical ductal hyperplasia, elevated risk  Assessment & Plan:   Assessment/Plan    Elizabeth Mera presents for a postoperative visit  Her biopsy confirmed atypical ductal hyperplasia  History of Present Illness:   See above    Interval History:   The patient tolerated her surgery well  She has no complaints referable to her surgery  Her wound is healing well  Review of Systems:   Review of Systems   All other systems reviewed and are negative        Past Medical History:     Patient Active Problem List   Diagnosis    Acid reflux disease    Anxiety    Benign essential hypertension    Obesity    Vitamin D deficiency    Atypical ductal hyperplasia of left breast    Tongue cancer ([address]    Anaphylaxis    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 ([address]    Scar condition and fibrosis of skin    Secondary and unspecified malignant neoplasm of lymph nodes of head, face, and neck     Past Medical History:   Diagnosis Date    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    Anxiety     Cancer ([facility] [address] [facility]     tongue     GERD (gastroesophageal reflux disease)     Herpes zoster     last Assessed 2016    Hypertension      Past Surgical History:   Procedure Laterality Date    BREAST LUMPECTOMY Left 2019    Procedure: BREAST LUMPECTOMY; BREAST NEEDLE LOCALIZATION (NEEDLE LOC AT 1130);   Surgeon: Heavenly Landeros MD;  [facility];  Service: Surgical Oncology     SECTION      GLOSSECTOMY Bilateral      Complete with Uniliateral Radical Neck Disscetion    MAMMO NEEDLE LOCALIZATION LEFT (ALL INC) Left 2019    MAMMO STEREOTACTIC BREAST BIOPSY LEFT (ALL INC) Left 2018    TONGUE SURGERY      BX tongue location base     Family History   Problem Relation Age of Onset    Diabetes Mother     Heart disease Father     Skin cancer Father 61    Breast cancer Paternal Aunt 48    Stomach cancer Maternal Grandmother 80    Prostate cancer Maternal Grandfather 80    Breast cancer Paternal Aunt 48     Social History     Socioeconomic History    Marital status: /Civil Union     Spouse name: Not on file    Number of children: Not on file    Years of education: Not on file    Highest education level: Not on file   Occupational History    Not on file   Social Needs    Financial resource strain: Not on file    Food insecurity:     Worry: Not on file     Inability: Not on file    Transportation needs:     Medical: Not on file     Non-medical: Not on file   Tobacco Use    Smoking status: Never Smoker    Smokeless tobacco: Never Used   Substance and Sexual Activity    Alcohol use: Yes     Comment: Social    Drug use: No    Sexual activity: Not on file   Lifestyle    Physical activity:     Days per week: Not on file     Minutes per session: Not on file    Stress: Not on file   Relationships    Social connections:     Talks on phone: Not on file     Gets together: Not on file     Attends Evangelical service: Not on file Active member of club or organization: Not on file     Attends meetings of clubs or organizations: Not on file     Relationship status: Not on file    Intimate partner violence:     Fear of current or ex partner: Not on file     Emotionally abused: Not on file     Physically abused: Not on file     Forced sexual activity: Not on file   Other Topics Concern    Not on file   Social History Narrative    Drinks Tea       Current Outpatient Medications:     FLUoxetine (PROzac) 10 MG tablet, TAKE 1 TABLET BY MOUTH EVERY DAY, Disp: 90 tablet, Rfl: 0    lisinopril (ZESTRIL) 10 mg tablet, TAKE 1 TABLET BY MOUTH ONCE DAILY, Disp: 90 tablet, Rfl: 3    loratadine-pseudoephedrine (CLARITIN-D 24 HOUR)  mg per 24 hr tablet, Take 1 tablet by mouth as needed  , Disp: , Rfl:     naproxen sodium (ALEVE) 220 MG tablet, Take by mouth, Disp: , Rfl:     pantoprazole (PROTONIX) 40 mg tablet, TAKE 1 TABLET BY MOUTH EVERY DAY, Disp: 90 tablet, Rfl: 3    EPINEPHrine (EPIPEN 2-VINCE) 0 3 mg/0 3 mL SOAJ, Inject 0 3 mL (0 3 mg total) into a muscle once for 1 dose, Disp: 0 3 mL, Rfl: 3  Allergies   Allergen Reactions    Penicillins Throat Swelling     [facility] - [id number]: as achild    Seasonal Ic [Cholestatin]     Sulfites Hives, Rash and Throat Swelling       Physical Exams:     Vitals:    02/11/19 0901   BP: 120/70   Pulse: 80   Resp: 16   Temp: 98 °F (36 7 °C)     Physical Exam   Pulmonary/Chest:   Examination of the left breast demonstrates a well-healed incision  There is no evidence of infection  Results:       Labs:       Discussion/Summary:   The patient's pathology shows no evidence of malignancy however the atypia does place her at elevated risk  I explained the patient that the tell acoustic model estimates her risk of around 49% though this calculator was shown to significantly over estimate risk for atypia    However because she has several foci the [address] [place] [address] would also place her in the category of having annual MRIs  I recommended she have an MRI approximately 1 month prior to seeing us at our six-month follow-up visit to allow her breast to heal   We will coordinate this for her  I have recommended clinical breast exams twice a year in addition to standard mammography in annual MRIs  All questions were answered the patient's satisfaction

## 2019-02-11 NOTE — PROGRESS NOTES
Surgical Oncology Breast Post-Op       [address] [facility]  [facility] [place]  [address] [facility] [address]    Shan Joaquin  1970  [id number]  [address]50 [facility],[address] [place] [address]  [facility] [place]  [phone]    Chief Complaint:   Elmira Donohue is seen for a post-operative visit of her recent left breast surgery  Oncology History:      No history exists  Left breast few foci of atypical ductal hyperplasia, elevated risk  Assessment & Plan:   Assessment/Plan    Elmira Donohue presents for a postoperative visit  Her biopsy confirmed atypical ductal hyperplasia  History of Present Illness:   See above    Interval History:   The patient tolerated her surgery well  She has no complaints referable to her surgery  Her wound is healing well  Review of Systems:   Review of Systems   All other systems reviewed and are negative  Past Medical History:     Patient Active Problem List   Diagnosis    Acid reflux disease    Anxiety    Benign essential hypertension    Obesity    Vitamin D deficiency    Atypical ductal hyperplasia of left breast    [facility]ue cancer ([facility] [address] [facility]    Anaphylaxis    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 ([address]    Scar condition and fibrosis of skin    Secondary and unspecified malignant neoplasm of lymph nodes of head, face, and neck     Past Medical History:   Diagnosis Date    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    Anxiety     Cancer ([address] )     tongue     GERD (gastroesophageal reflux disease)     Herpes zoster     last Assessed     Hypertension      Past Surgical History:   Procedure Laterality Date    BREAST LUMPECTOMY Left 2019    Procedure: BREAST LUMPECTOMY; BREAST NEEDLE LOCALIZATION (NEEDLE LOC AT 1130);   Surgeon: Ama Gil MD;  [facility];  Service: Surgical Oncology     SECTION      GLOSSECTOMY Bilateral      Complete with Uniliateral Radical Neck Disscetion    MAMMO NEEDLE LOCALIZATION LEFT (ALL INC) Left 1/29/2019    MAMMO STEREOTACTIC BREAST BIOPSY LEFT (ALL INC) Left 11/26/2018    TONGUE SURGERY      BX tongue location base     Family History   Problem Relation Age of Onset    Diabetes Mother     Heart disease Father     Skin cancer Father 61    Breast cancer Paternal Aunt 48    Stomach cancer Maternal Grandmother 80    Prostate cancer Maternal Grandfather 80    Breast cancer Paternal Aunt 48     Social History     Socioeconomic History    Marital status: /Civil Union     Spouse name: Not on file    Number of children: Not on file    Years of education: Not on file    Highest education level: Not on file   Occupational History    Not on file   Social Needs    Financial resource strain: Not on file    Food insecurity:     Worry: Not on file     Inability: Not on file    Transportation needs:     Medical: Not on file     Non-medical: Not on file   Tobacco Use    Smoking status: Never Smoker    Smokeless tobacco: Never Used   Substance and Sexual Activity    Alcohol use: Yes     Comment: Social    Drug use: No    Sexual activity: Not on file   Lifestyle    Physical activity:     Days per week: Not on file     Minutes per session: Not on file    Stress: Not on file   Relationships    Social connections:     Talks on phone: Not on file     Gets together: Not on file     Attends Oriental orthodox service: Not on file     Active member of club or organization: Not on file     Attends meetings of clubs or organizations: Not on file     Relationship status: Not on file    Intimate partner violence:     Fear of current or ex partner: Not on file     Emotionally abused: Not on file     Physically abused: Not on file     Forced sexual activity: Not on file   Other Topics Concern    Not on file   Social History Narrative    Drinks Tea       Current Outpatient Medications:     FLUoxetine (PROzac) 10 MG tablet, TAKE 1 TABLET BY MOUTH EVERY DAY, Disp: 90 tablet, Rfl: 0    lisinopril (ZESTRIL) 10 mg tablet, TAKE 1 TABLET BY MOUTH ONCE DAILY, Disp: 90 tablet, Rfl: 3    loratadine-pseudoephedrine (CLARITIN-D 24 HOUR)  mg per 24 hr tablet, Take 1 tablet by mouth as needed  , Disp: , Rfl:     naproxen sodium (ALEVE) 220 MG tablet, Take by mouth, Disp: , Rfl:     pantoprazole (PROTONIX) 40 mg tablet, TAKE 1 TABLET BY MOUTH EVERY DAY, Disp: 90 tablet, Rfl: 3    EPINEPHrine (EPIPEN 2-VINCE) 0 3 mg/0 3 mL SOAJ, Inject 0 3 mL (0 3 mg total) into a muscle once for 1 dose, Disp: 0 3 mL, Rfl: 3  Allergies   Allergen Reactions    Penicillins Throat Swelling     Annotation - [id number]: as achild    Seasonal Ic [Cholestatin]     Sulfites Hives, Rash and Throat Swelling       Physical Exams:     Vitals:    02/11/19 0901   BP: 120/70   Pulse: 80   Resp: 16   Temp: 98 °F (36 7 °C)     Physical Exam   Pulmonary/Chest:   Examination of the left breast demonstrates a well-healed incision  There is no evidence of infection  Results:       Labs:       Discussion/Summary:   The patient's pathology shows no evidence of malignancy however the atypia does place her at elevated risk  I explained the patient that the tell acoustic model estimates her risk of around 49% though this calculator was shown to significantly over estimate risk for atypia  However because she has several foci the [address] [place] [address] would also place her in the category of having annual MRIs  I recommended she have an MRI approximately 1 month prior to seeing us at our six-month follow-up visit to allow her breast to heal   We will coordinate this for her  I have recommended clinical breast exams twice a year in addition to standard mammography in annual MRIs  All questions were answered the patient's satisfaction

## 2019-02-12 LAB
LAB AP GYN PRIMARY INTERPRETATION: NORMAL
Lab: NORMAL

## 2019-02-24 ENCOUNTER — HOSPITAL ENCOUNTER (EMERGENCY)
Facility: HOSPITAL | Age: 49
Discharge: HOME | End: 2019-02-24
Attending: EMERGENCY MEDICINE
Payer: COMMERCIAL

## 2019-02-24 VITALS
TEMPERATURE: 98.8 F | WEIGHT: 203.48 LBS | HEART RATE: 76 BPM | DIASTOLIC BLOOD PRESSURE: 63 MMHG | OXYGEN SATURATION: 95 % | RESPIRATION RATE: 18 BRPM | BODY MASS INDEX: 34.93 KG/M2 | SYSTOLIC BLOOD PRESSURE: 144 MMHG

## 2019-02-24 DIAGNOSIS — T78.2XXA ANAPHYLAXIS, INITIAL ENCOUNTER: Primary | ICD-10-CM

## 2019-02-24 LAB
ANION GAP SERPL CALCULATED.3IONS-SCNC: 11 MMOL/L (ref 4–13)
BASOPHILS # BLD AUTO: 0.03 THOUSANDS/ΜL (ref 0–0.1)
BASOPHILS NFR BLD AUTO: 0 % (ref 0–1)
BUN SERPL-MCNC: 17 MG/DL (ref 5–25)
CALCIUM SERPL-MCNC: 9 MG/DL (ref 8.3–10.1)
CHLORIDE SERPL-SCNC: 100 MMOL/L (ref 100–108)
CO2 SERPL-SCNC: 26 MMOL/L (ref 21–32)
CREAT SERPL-MCNC: 0.89 MG/DL (ref 0.6–1.3)
EOSINOPHIL # BLD AUTO: 0.45 THOUSAND/ΜL (ref 0–0.61)
EOSINOPHIL NFR BLD AUTO: 5 % (ref 0–6)
ERYTHROCYTE [DISTWIDTH] IN BLOOD BY AUTOMATED COUNT: 13.1 % (ref 11.6–15.1)
GFR SERPL CREATININE-BSD FRML MDRD: 76 ML/MIN/1.73SQ M
GLUCOSE SERPL-MCNC: 231 MG/DL (ref 65–140)
HCT VFR BLD AUTO: 42.7 % (ref 34.8–46.1)
HGB BLD-MCNC: 14.5 G/DL (ref 11.5–15.4)
IMM GRANULOCYTES # BLD AUTO: 0.02 THOUSAND/UL (ref 0–0.2)
IMM GRANULOCYTES NFR BLD AUTO: 0 % (ref 0–2)
LYMPHOCYTES # BLD AUTO: 3.02 THOUSANDS/ΜL (ref 0.6–4.47)
LYMPHOCYTES NFR BLD AUTO: 31 % (ref 14–44)
MCH RBC QN AUTO: 30.4 PG (ref 26.8–34.3)
MCHC RBC AUTO-ENTMCNC: 34 G/DL (ref 31.4–37.4)
MCV RBC AUTO: 90 FL (ref 82–98)
MONOCYTES # BLD AUTO: 0.72 THOUSAND/ΜL (ref 0.17–1.22)
MONOCYTES NFR BLD AUTO: 8 % (ref 4–12)
NEUTROPHILS # BLD AUTO: 5.41 THOUSANDS/ΜL (ref 1.85–7.62)
NEUTS SEG NFR BLD AUTO: 56 % (ref 43–75)
NRBC BLD AUTO-RTO: 0 /100 WBCS
PLATELET # BLD AUTO: 319 THOUSANDS/UL (ref 149–390)
PMV BLD AUTO: 10.6 FL (ref 8.9–12.7)
POTASSIUM SERPL-SCNC: 3.7 MMOL/L (ref 3.5–5.3)
RBC # BLD AUTO: 4.77 MILLION/UL (ref 3.81–5.12)
SODIUM SERPL-SCNC: 137 MMOL/L (ref 136–145)
WBC # BLD AUTO: 9.65 THOUSAND/UL (ref 4.31–10.16)

## 2019-02-24 PROCEDURE — 96372 THER/PROPH/DIAG INJ SC/IM: CPT

## 2019-02-24 PROCEDURE — 96361 HYDRATE IV INFUSION ADD-ON: CPT

## 2019-02-24 PROCEDURE — 99285 EMERGENCY DEPT VISIT HI MDM: CPT

## 2019-02-24 PROCEDURE — 96375 TX/PRO/DX INJ NEW DRUG ADDON: CPT

## 2019-02-24 PROCEDURE — 93005 ELECTROCARDIOGRAM TRACING: CPT

## 2019-02-24 PROCEDURE — 36415 COLL VENOUS BLD VENIPUNCTURE: CPT | Performed by: EMERGENCY MEDICINE

## 2019-02-24 PROCEDURE — 96374 THER/PROPH/DIAG INJ IV PUSH: CPT

## 2019-02-24 PROCEDURE — 80048 BASIC METABOLIC PNL TOTAL CA: CPT | Performed by: EMERGENCY MEDICINE

## 2019-02-24 PROCEDURE — 85025 COMPLETE CBC W/AUTO DIFF WBC: CPT | Performed by: EMERGENCY MEDICINE

## 2019-02-24 RX ORDER — EPINEPHRINE 1 MG/ML
0.3 INJECTION, SOLUTION, CONCENTRATE INTRAVENOUS ONCE
Status: COMPLETED | OUTPATIENT
Start: 2019-02-24 | End: 2019-02-24

## 2019-02-24 RX ORDER — EPINEPHRINE 0.3 MG/.3ML
0.3 INJECTION SUBCUTANEOUS ONCE
Qty: 0.3 ML | Refills: 0 | Status: SHIPPED | OUTPATIENT
Start: 2019-02-24 | End: 2019-02-24

## 2019-02-24 RX ORDER — METHYLPREDNISOLONE SODIUM SUCCINATE 125 MG/2ML
125 INJECTION, POWDER, LYOPHILIZED, FOR SOLUTION INTRAMUSCULAR; INTRAVENOUS ONCE
Status: COMPLETED | OUTPATIENT
Start: 2019-02-24 | End: 2019-02-24

## 2019-02-24 RX ORDER — FLUTICASONE PROPIONATE 50 MCG
1 SPRAY, SUSPENSION (ML) NASAL DAILY
Status: DISCONTINUED | OUTPATIENT
Start: 2019-02-24 | End: 2019-02-24 | Stop reason: HOSPADM

## 2019-02-24 RX ORDER — PREDNISONE 20 MG/1
20 TABLET ORAL DAILY
Qty: 8 TABLET | Refills: 0 | Status: SHIPPED | OUTPATIENT
Start: 2019-02-25 | End: 2019-03-01

## 2019-02-24 RX ADMIN — EPINEPHRINE 0.3 MG: 1 INJECTION, SOLUTION, CONCENTRATE INTRAVENOUS at 11:52

## 2019-02-24 RX ADMIN — FLUTICASONE PROPIONATE 1 SPRAY: 50 SPRAY, METERED NASAL at 14:45

## 2019-02-24 RX ADMIN — METHYLPREDNISOLONE SODIUM SUCCINATE 125 MG: 125 INJECTION, POWDER, FOR SOLUTION INTRAMUSCULAR; INTRAVENOUS at 12:00

## 2019-02-24 RX ADMIN — FAMOTIDINE 20 MG: 10 INJECTION, SOLUTION INTRAVENOUS at 11:54

## 2019-02-24 RX ADMIN — SODIUM CHLORIDE 1000 ML: 0.9 INJECTION, SOLUTION INTRAVENOUS at 11:58

## 2019-02-24 NOTE — ED PROVIDER NOTES
History  Chief Complaint   Patient presents with    Breathing Difficulty     Pt gave herself one epi pen one hour ago as well as 2 benadryl  Pt ate raisins  Having trouble getting a breath in, pt reports throat feels tight   Allergic Reaction       History provided by:  Patient   used: No    Allergic Reaction   Presenting symptoms: difficulty breathing, difficulty swallowing, itching, rash and swelling    Presenting symptoms: no wheezing    Difficulty breathing:     Severity:  Moderate    Onset quality:  Sudden    Duration:  1 hour    Timing:  Constant    Progression:  Unchanged  Severity:  Severe  Duration:  1 hour  Prior allergic episodes:  Food/nut allergies  Context: food    Relieved by:  None tried  Worsened by:  Nothing  Ineffective treatments:  Epinephrine      Prior to Admission Medications   Prescriptions Last Dose Informant Patient Reported? Taking?    EPINEPHrine (EPIPEN 2-VINCE) 0 3 mg/0 3 mL SOAJ   No Yes   Sig: Inject 0 3 mL (0 3 mg total) into a muscle once for 1 dose   FLUoxetine (PROzac) 10 MG tablet   No Yes   Sig: TAKE 1 TABLET BY MOUTH EVERY DAY   lisinopril (ZESTRIL) 10 mg tablet  Self No Yes   Sig: TAKE 1 TABLET BY MOUTH ONCE DAILY   loratadine-pseudoephedrine (CLARITIN-D 24 HOUR)  mg per 24 hr tablet  Self Yes Yes   Sig: Take 1 tablet by mouth as needed     naproxen sodium (ALEVE) 220 MG tablet  Self Yes Yes   Sig: Take by mouth   pantoprazole (PROTONIX) 40 mg tablet  Self No Yes   Sig: TAKE 1 TABLET BY MOUTH EVERY DAY      Facility-Administered Medications: None       Past Medical History:   Diagnosis Date    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    Anxiety     Cancer ([facility] [address] ) 2009    tongue     GERD (gastroesophageal reflux disease)     Herpes zoster     last Assessed 2016    Hypertension        Past Surgical History:   Procedure Laterality Date    BREAST LUMPECTOMY Left 1/29/2019    Procedure: BREAST LUMPECTOMY; BREAST NEEDLE LOCALIZATION (NEEDLE LOC AT 1650); Surgeon: Ciro Chu MD;  [facility];  Service: Surgical Oncology     SECTION      GLOSSECTOMY Bilateral      Complete with Uniliateral Radical Neck Disscetion    MAMMO NEEDLE LOCALIZATION LEFT (ALL INC) Left 2019    MAMMO STEREOTACTIC BREAST BIOPSY LEFT (ALL INC) Left 2018    TONGUE SURGERY      BX tongue location base       Family History   Problem Relation Age of Onset    Diabetes Mother     Heart disease Father     Skin cancer Father 61    Breast cancer Paternal Aunt 48    Stomach cancer Maternal Grandmother 80    Prostate cancer Maternal Grandfather 80    Breast cancer Paternal Aunt 48     I have reviewed and agree with the history as documented  Social History     Tobacco Use    Smoking status: Never Smoker    Smokeless tobacco: Never Used   Substance Use Topics    Alcohol use: Yes     Comment: Social    Drug use: No        Review of Systems   Constitutional: Negative for activity change, appetite change, chills, diaphoresis, fatigue and fever  HENT: Positive for facial swelling and trouble swallowing  Negative for congestion, dental problem, ear discharge, nosebleeds, rhinorrhea and sinus pressure  Eyes: Negative for photophobia, discharge, itching and visual disturbance  Respiratory: Positive for shortness of breath  Negative for choking, chest tightness and wheezing  Cardiovascular: Negative for chest pain, palpitations and leg swelling  Gastrointestinal: Negative for abdominal distention, abdominal pain, constipation, diarrhea, nausea and vomiting  Endocrine: Negative for polydipsia and polyphagia  Genitourinary: Negative for decreased urine volume, difficulty urinating, dysuria, flank pain, frequency, hematuria, vaginal bleeding and vaginal discharge  Musculoskeletal: Negative for back pain, gait problem, joint swelling, neck pain and neck stiffness  Skin: Positive for itching and rash  Negative for color change     Neurological: Negative for dizziness, facial asymmetry, speech difficulty, weakness and light-headedness  Psychiatric/Behavioral: Negative for agitation and behavioral problems  The patient is not nervous/anxious and is not hyperactive  All other systems reviewed and are negative  Physical Exam  Physical Exam   Constitutional: She is oriented to person, place, and time  She appears well-developed and well-nourished  She appears distressed  HENT:   Head: Normocephalic and atraumatic  Swelling to eyelids bilaterally  Tongue swelling, history of tongue cancer   Eyes: Pupils are equal, round, and reactive to light  EOM are normal    Neck: Normal range of motion  Neck supple  Cardiovascular: Normal rate, regular rhythm and normal heart sounds  No murmur heard  Pulmonary/Chest: Effort normal and breath sounds normal  No respiratory distress  She has no wheezes  She has no rales  Tachypnea   Abdominal: Soft  Bowel sounds are normal  She exhibits no distension  There is no tenderness  There is no rebound and no guarding  Musculoskeletal: Normal range of motion  She exhibits no edema or deformity  Lymphadenopathy:     She has no cervical adenopathy  Neurological: She is alert and oriented to person, place, and time  No cranial nerve deficit  She exhibits normal muscle tone  Coordination normal    Skin: Capillary refill takes less than 2 seconds  No rash noted  No erythema  Psychiatric: She has a normal mood and affect  Her behavior is normal    Nursing note and vitals reviewed        Vital Signs  ED Triage Vitals   Temperature Pulse Respirations Blood Pressure SpO2   02/24/19 1205 02/24/19 1144 02/24/19 1144 02/24/19 1144 02/24/19 1144   98 8 °F (37 1 °C) 83 (!) 23 144/67 95 %      Temp Source Heart Rate Source Patient Position - Orthostatic VS BP Location FiO2 (%)   02/24/19 1205 02/24/19 1144 02/24/19 1424 02/24/19 1144 --   Axillary Monitor Lying Right arm       Pain Score       02/24/19 1144       No Pain           Vitals:    02/24/19 1307 02/24/19 1424 02/24/19 1445 02/24/19 1527   BP: 138/55 131/62 140/61 144/63   Pulse: 78 75 91 76   Patient Position - Orthostatic VS:  Lying         Visual Acuity      ED Medications  Medications   fluticasone (FLONASE) 50 mcg/act nasal spray 1 spray (1 spray Each Nare Given 2/24/19 1445)   EPINEPHrine PF (ADRENALIN) 1 mg/mL injection 0 3 mg (0 3 mg Intramuscular Given 2/24/19 1152)   famotidine (PEPCID) injection 20 mg (20 mg Intravenous Given 2/24/19 1154)   methylPREDNISolone sodium succinate (Solu-MEDROL) injection 125 mg (125 mg Intravenous Given 2/24/19 1200)   sodium chloride 0 9 % bolus 1,000 mL (0 mL Intravenous Stopped 2/24/19 1526)       Diagnostic Studies  Results Reviewed     Procedure Component Value Units Date/Time    Basic metabolic panel [[id number]]  (Abnormal) Collected:  02/24/19 1205    Lab Status:  Final result Specimen:  Blood from Arm, Right Updated:  02/24/19 1225     Sodium 137 mmol/L      Potassium 3 7 mmol/L      Chloride 100 mmol/L      CO2 26 mmol/L      ANION GAP 11 mmol/L      BUN 17 mg/dL      Creatinine 0 89 mg/dL      Glucose 231 mg/dL      Calcium 9 0 mg/dL      eGFR 76 ml/min/1 73sq m     Narrative:       National Kidney Disease Education Program recommendations are as follows:  GFR calculation is accurate only with a steady state creatinine  Chronic Kidney disease less than 60 ml/min/1 73 sq  meters  Kidney failure less than 15 ml/min/1 73 sq  meters      CBC and differential [[id number]] Collected:  02/24/19 1205    Lab Status:  Final result Specimen:  Blood from Arm, Right Updated:  02/24/19 1211     WBC 9 65 Thousand/uL      RBC 4 77 Million/uL      Hemoglobin 14 5 g/dL      Hematocrit 42 7 %      MCV 90 fL      MCH 30 4 pg      MCHC 34 0 g/dL      RDW 13 1 %      MPV 10 6 fL      Platelets 940 Thousands/uL      nRBC 0 /100 WBCs      Neutrophils Relative 56 %      Immat GRANS % 0 %      Lymphocytes Relative 31 %      Monocytes Relative 8 % Eosinophils Relative 5 %      Basophils Relative 0 %      Neutrophils Absolute 5 41 Thousands/µL      Immature Grans Absolute 0 02 Thousand/uL      Lymphocytes Absolute 3 02 Thousands/µL      Monocytes Absolute 0 72 Thousand/µL      Eosinophils Absolute 0 45 Thousand/µL      Basophils Absolute 0 03 Thousands/µL                  No orders to display              Procedures  ECG 12 Lead Documentation  Date/Time: 2/24/2019 11:48 AM  Performed by: Eloisa Kehr, MD  Authorized by: Eloisa Kehr, MD     Indications / Diagnosis:  Shortness of breath  ECG reviewed by me, the ED Provider: yes    Interpretation:     Interpretation: normal    Rate:     ECG rate:  80    ECG rate assessment: normal    Rhythm:     Rhythm: sinus rhythm    Ectopy:     Ectopy: none    QRS:     QRS axis:  Left    QRS intervals:  Normal  Conduction:     Conduction: normal    ST segments:     ST segments:  Normal  T waves:     T waves: normal    CriticalCare Time  Performed by: Eloisa Kehr, MD  Authorized by: Eloisa Kehr, MD     Critical care provider statement:     Critical care time (minutes):  40    Critical care time was exclusive of:  Separately billable procedures and treating other patients and teaching time    Critical care was necessary to treat or prevent imminent or life-threatening deterioration of the following conditions: Anaphylaxis      Critical care was time spent personally by me on the following activities:  Blood draw for specimens, evaluation of patient's response to treatment, examination of patient, ordering and review of laboratory studies and re-evaluation of patient's condition    I assumed direction of critical care for this patient from another provider in my specialty: no             Phone Contacts  ED Phone Contact    ED Course                               MDM  Number of Diagnoses or Management Options  Anaphylaxis, initial encounter: new and requires workup  Diagnosis management comments: Patient with anaphylaxis to rajyoti  Give herself epinephrine at home with some improvement  Continued to have symptoms  So she came to the emergency department  Upon arrival, patient with facial swelling and, description of difficulty swallowing and diffuse itchy rash  Has a history of presentation consistent with anaphylaxis  Patient given IM epinephrine, is Solu-Medrol, Pepcid as she had previously taken Benadryl at home  She is given IV fluids and observed in emergency department for roughly 4 hours  She had no return of symptoms, tolerated secretions, no development of wheezing  Speaking in full sentences  She feels much better and feels stable for discharge home  Four days of prednisone given, Benadryl p r n  Alberto Goody Refill of epinephrine pen given and strict return precautions given to patient and family for any return of symptoms or need to use EpiPen  Amount and/or Complexity of Data Reviewed  Clinical lab tests: ordered and reviewed  Tests in the medicine section of CPT®: ordered and reviewed  Obtain history from someone other than the patient: yes    Risk of Complications, Morbidity, and/or Mortality  Presenting problems: high  Diagnostic procedures: moderate  Management options: high    Patient Progress  Patient progress: improved      Disposition  Final diagnoses:   Anaphylaxis, initial encounter     Time reflects when diagnosis was documented in both MDM as applicable and the Disposition within this note     Time User Action Codes Description Comment    2/24/2019  3:17 PM Pacheco Doss  2XXA] Anaphylaxis, initial encounter     2/24/2019  3:19 PM Catalina Baig  2XXA] Anaphylaxis, initial encounter       ED Disposition     ED Disposition Condition Date/Time Comment    Discharge Stable Sun Feb 24, 2019  3:17 PM Arabella Perry discharge to home/self care              Follow-up Information     Follow up With Specialties Details Why Contact Info Additional Information    Zehra Bolden MD Family Medicine Schedule an appointment as soon as possible for a visit in 3 days As needed, If symptoms worsen [address] [phone]       [facility] Emergency Department Emergency Medicine  If symptoms worsen [address] [facility]  [facility] ED, [address], [place], [place], [address]          Discharge Medication List as of 2/24/2019  3:20 PM      START taking these medications    Details   !! EPINEPHrine (EPIPEN) 0 3 mg/0 3 mL SOAJ Inject 0 3 mL (0 3 mg total) into a muscle once for 1 dose, Starting Sun 2/24/2019, Print      predniSONE 20 mg tablet Take 1 tablet (20 mg total) by mouth daily for 4 days, Starting Mon 2/25/2019, Until Fri 3/1/2019, Print       !! - Potential duplicate medications found  Please discuss with provider  CONTINUE these medications which have NOT CHANGED    Details   !! EPINEPHrine (EPIPEN 2-VINCE) 0 3 mg/0 3 mL SOAJ Inject 0 3 mL (0 3 mg total) into a muscle once for 1 dose, Starting Tue 8/14/2018, Normal      FLUoxetine (PROzac) 10 MG tablet TAKE 1 TABLET BY MOUTH EVERY DAY, Normal      lisinopril (ZESTRIL) 10 mg tablet TAKE 1 TABLET BY MOUTH ONCE DAILY, Normal      loratadine-pseudoephedrine (CLARITIN-D 24 HOUR)  mg per 24 hr tablet Take 1 tablet by mouth as needed  , Starting Wed 1/13/2016, Historical Med      naproxen sodium (ALEVE) 220 MG tablet Take by mouth, Historical Med      pantoprazole (PROTONIX) 40 mg tablet TAKE 1 TABLET BY MOUTH EVERY DAY, Normal       !! - Potential duplicate medications found  Please discuss with provider  No discharge procedures on file      ED Provider  Electronically Signed by           Eloisa Kehr, MD  02/24/19 9301

## 2019-02-25 LAB
ATRIAL RATE: 80 BPM
P AXIS: 39 DEGREES
PR INTERVAL: 142 MS
QRS AXIS: 32 DEGREES
QRSD INTERVAL: 70 MS
QT INTERVAL: 350 MS
QTC INTERVAL: 403 MS
T WAVE AXIS: 33 DEGREES
VENTRICULAR RATE: 80 BPM

## 2019-02-25 PROCEDURE — 93010 ELECTROCARDIOGRAM REPORT: CPT | Performed by: INTERNAL MEDICINE

## 2019-05-05 DIAGNOSIS — N95.9 PERIMENOPAUSAL DISORDER: ICD-10-CM

## 2019-05-06 RX ORDER — FLUOXETINE 10 MG/1
TABLET, FILM COATED ORAL
Qty: 90 TABLET | Refills: 0 | Status: SHIPPED | OUTPATIENT
Start: 2019-05-06 | End: 2019-08-04 | Stop reason: SDUPTHER

## 2019-05-08 ENCOUNTER — OFFICE VISIT (OUTPATIENT)
Dept: FAMILY MEDICINE CLINIC | Facility: CLINIC | Age: 49
End: 2019-05-08
Payer: COMMERCIAL

## 2019-05-08 VITALS
SYSTOLIC BLOOD PRESSURE: 138 MMHG | RESPIRATION RATE: 18 BRPM | WEIGHT: 208.2 LBS | DIASTOLIC BLOOD PRESSURE: 86 MMHG | HEIGHT: 64 IN | TEMPERATURE: 97.1 F | HEART RATE: 70 BPM | OXYGEN SATURATION: 98 % | BODY MASS INDEX: 35.55 KG/M2

## 2019-05-08 DIAGNOSIS — Z12.11 COLON CANCER SCREENING: ICD-10-CM

## 2019-05-08 DIAGNOSIS — H69.91 DISORDER OF RIGHT EUSTACHIAN TUBE: Primary | ICD-10-CM

## 2019-05-08 DIAGNOSIS — R73.9 ELEVATED BLOOD SUGAR: ICD-10-CM

## 2019-05-08 PROBLEM — E66.9 OBESITY (BMI 30-39.9): Status: ACTIVE | Noted: 2019-05-08

## 2019-05-08 PROCEDURE — 3008F BODY MASS INDEX DOCD: CPT | Performed by: FAMILY MEDICINE

## 2019-05-08 PROCEDURE — 99214 OFFICE O/P EST MOD 30 MIN: CPT | Performed by: FAMILY MEDICINE

## 2019-05-08 PROCEDURE — 1036F TOBACCO NON-USER: CPT | Performed by: FAMILY MEDICINE

## 2019-05-08 RX ORDER — EPINEPHRINE 0.3 MG/.3ML
INJECTION SUBCUTANEOUS
Refills: 0 | COMMUNITY
Start: 2019-03-09 | End: 2021-09-14 | Stop reason: SDUPTHER

## 2019-05-08 RX ORDER — FLUTICASONE PROPIONATE 50 MCG
2 SPRAY, SUSPENSION (ML) NASAL DAILY
Qty: 16 G | Refills: 0 | Status: SHIPPED | OUTPATIENT
Start: 2019-05-08 | End: 2019-06-05 | Stop reason: SDUPTHER

## 2019-06-03 ENCOUNTER — APPOINTMENT (OUTPATIENT)
Dept: LAB | Facility: CLINIC | Age: 49
End: 2019-06-03
Payer: COMMERCIAL

## 2019-06-03 DIAGNOSIS — N60.92 ATYPICAL DUCTAL HYPERPLASIA OF LEFT BREAST: ICD-10-CM

## 2019-06-03 LAB
BUN SERPL-MCNC: 18 MG/DL (ref 5–25)
CREAT SERPL-MCNC: 0.64 MG/DL (ref 0.6–1.3)
GFR SERPL CREATININE-BSD FRML MDRD: 105 ML/MIN/1.73SQ M

## 2019-06-03 PROCEDURE — 36415 COLL VENOUS BLD VENIPUNCTURE: CPT

## 2019-06-03 PROCEDURE — 82565 ASSAY OF CREATININE: CPT

## 2019-06-03 PROCEDURE — 84520 ASSAY OF UREA NITROGEN: CPT

## 2019-06-05 DIAGNOSIS — H69.91 DISORDER OF RIGHT EUSTACHIAN TUBE: ICD-10-CM

## 2019-06-05 RX ORDER — FLUTICASONE PROPIONATE 50 MCG
SPRAY, SUSPENSION (ML) NASAL
Qty: 1 BOTTLE | Refills: 0 | Status: SHIPPED | OUTPATIENT
Start: 2019-06-05 | End: 2019-07-07 | Stop reason: SDUPTHER

## 2019-06-07 ENCOUNTER — HOSPITAL ENCOUNTER (OUTPATIENT)
Dept: RADIOLOGY | Facility: HOSPITAL | Age: 49
Discharge: HOME | End: 2019-06-07
Attending: SURGERY
Payer: COMMERCIAL

## 2019-06-07 DIAGNOSIS — N60.92 ATYPICAL DUCTAL HYPERPLASIA OF LEFT BREAST: ICD-10-CM

## 2019-06-07 PROCEDURE — C8908 MRI W/O FOL W/CONT, BREAST,: HCPCS

## 2019-06-07 PROCEDURE — A9585 GADOBUTROL INJECTION: HCPCS | Performed by: SURGERY

## 2019-06-07 RX ADMIN — GADOBUTROL 10 ML: 604.72 INJECTION INTRAVENOUS at 15:08

## 2019-07-07 DIAGNOSIS — H69.91 DISORDER OF RIGHT EUSTACHIAN TUBE: ICD-10-CM

## 2019-07-07 RX ORDER — FLUTICASONE PROPIONATE 50 MCG
SPRAY, SUSPENSION (ML) NASAL
Qty: 16 ML | Refills: 0 | Status: SHIPPED | OUTPATIENT
Start: 2019-07-07

## 2019-08-04 DIAGNOSIS — N95.9 PERIMENOPAUSAL DISORDER: ICD-10-CM

## 2019-08-05 RX ORDER — FLUOXETINE 10 MG/1
TABLET, FILM COATED ORAL
Qty: 90 TABLET | Refills: 2 | Status: SHIPPED | OUTPATIENT
Start: 2019-08-05 | End: 2020-01-07 | Stop reason: SDUPTHER

## 2019-08-27 NOTE — PROGRESS NOTES
Surgical Oncology Follow Up       [address] [facility],[address]  [facility]  [address] [address]    Hortencia Sat  1970  [phone]      Chief Complaint   Patient presents with    Breast Cancer     Pt is here for a six month follow up       Assessment/Plan:  ,1  Atypical ductal hyperplasia of left breast  - 6 mo f/u visit    2  Visit for screening mammogram  - Mammo screening bilateral w 3d & cad; Future      Discussion/Summary:  Patient is a 58-year-old female who presents today for atypical ductal hyperplasia diagnosed in December 2018  She underwent a lumpectomy by Dr Johnathan Tavares  She also has a history of tongue cancer was treated at the [facility] approximately 10 years ago with surgery alone  Dr Johnathan Tavares has recommended annual screening with breast MRI given the multiple foci of ADH on surgical pathology  An MRI was performed on June 7, 2019 which was BI-RADS 2  She will be due for a bilateral mammogram in November  I will order this for her today  She states that 2 of her paternal aunts had undergone breast surgery alone  She is not sure if pathology was benign or malignant but states that they did not receive any further treatment  Their surgeries both occurred when they were in the 35s  No other family history of breast cancer  There are no concerns on today's exam   The patient does not see a gynecologist and the no one else performs a breast exam for her  Therefore, I will see her back in 6 months or sooner if the need arises  I did provide her with the [facility] [address] [facility]  She was instructed to call with any new concerns or symptoms prior to that time  All of her questions were answered  History of Present Illness:     -Interval History:  Patient presents today for a six-month follow-up visit for atypical ductal hyperplasia diagnosed in December 2018   She had an MRI performed on June 7, 2019 which was BI-RADS 2  She notices no changes on self-exam     Review of Systems:  Review of Systems   Constitutional: Negative for activity change, appetite change, chills, fatigue, fever and unexpected weight change  HENT: Negative for trouble swallowing  Eyes: Negative for pain, redness and visual disturbance  Respiratory: Negative for cough, shortness of breath and wheezing  Cardiovascular: Negative for chest pain, palpitations and leg swelling  Gastrointestinal: Negative for abdominal pain, constipation, diarrhea, nausea and vomiting  Endocrine: Negative for cold intolerance and heat intolerance  Musculoskeletal: Positive for arthralgias (chronic) and back pain (chronic)  Negative for gait problem and myalgias  Skin: Negative for color change and rash  Neurological: Negative for dizziness, syncope, light-headedness, numbness and headaches  Hematological: Negative for adenopathy  Psychiatric/Behavioral: Negative for agitation and confusion  All other systems reviewed and are negative  Patient Active Problem List   Diagnosis    Acid reflux disease    Anxiety    Benign essential hypertension    Obesity    Vitamin D deficiency    Atypical ductal hyperplasia of left breast    [facility]    Anaphylaxis    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 ([facility] [address] [facility]    Scar condition and fibrosis of skin    Secondary and unspecified malignant neoplasm of lymph nodes of head, face, and neck    Elevated blood sugar    Obesity (BMI 30-39  9)     Past Medical History:   Diagnosis Date    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    Anxiety     Cancer ([facility] [address] ) 2009    tongue     GERD (gastroesophageal reflux disease)     Herpes zoster     last Assessed 2016    Hypertension      Past Surgical History:   Procedure Laterality Date    BREAST LUMPECTOMY Left 1/29/2019    Procedure: BREAST LUMPECTOMY; BREAST NEEDLE LOCALIZATION (NEEDLE LOC AT 1130);   Surgeon: Dain Reyes MD; Location: [facility];  Service: Surgical Oncology     SECTION      GLOSSECTOMY Bilateral      Complete with Uniliateral Radical Neck Disscetion    MAMMO NEEDLE LOCALIZATION LEFT (ALL INC) Left 2019    MAMMO STEREOTACTIC BREAST BIOPSY LEFT (ALL INC) Left 2018    TONGUE SURGERY      BX tongue location base     Family History   Problem Relation Age of Onset    Diabetes Mother     Heart disease Father     Skin cancer Father 61    Breast cancer Paternal Aunt 48    Stomach cancer Maternal Grandmother 80    Prostate cancer Maternal Grandfather 80    Breast cancer Paternal Aunt 48     Social History     Socioeconomic History    Marital status: /Civil Union     Spouse name: Not on file    Number of children: Not on file    Years of education: Not on file    Highest education level: Not on file   Occupational History    Not on file   Social Needs    Financial resource strain: Not on file    Food insecurity:     Worry: Not on file     Inability: Not on file    Transportation needs:     Medical: Not on file     Non-medical: Not on file   Tobacco Use    Smoking status: Never Smoker    Smokeless tobacco: Never Used   Substance and Sexual Activity    Alcohol use: Yes     Comment: Social    Drug use: No    Sexual activity: Not on file   Lifestyle    Physical activity:     Days per week: Not on file     Minutes per session: Not on file    Stress: Not on file   Relationships    Social connections:     Talks on phone: Not on file     Gets together: Not on file     Attends Yazdanism service: Not on file     Active member of club or organization: Not on file     Attends meetings of clubs or organizations: Not on file     Relationship status: Not on file    Intimate partner violence:     Fear of current or ex partner: Not on file     Emotionally abused: Not on file     Physically abused: Not on file     Forced sexual activity: Not on file   Other Topics Concern    Not on file Social History Narrative    Drinks Tea       Current Outpatient Medications:     EPINEPHrine (EPIPEN) 0 3 mg/0 3 mL SOAJ, INJECT 0 3 ML INTO A MUSCLE ONCE FOR 1 DOSE, Disp: , Rfl: 0    FLUoxetine (PROzac) 10 MG tablet, TAKE 1 TABLET BY MOUTH EVERY DAY, Disp: 90 tablet, Rfl: 2    fluticasone (FLONASE) 50 mcg/act nasal spray, SPRAY 2 SPRAYS INTO EACH NOSTRIL EVERY DAY, Disp: 16 mL, Rfl: 0    lisinopril (ZESTRIL) 10 mg tablet, TAKE 1 TABLET BY MOUTH ONCE DAILY, Disp: 90 tablet, Rfl: 3    loratadine-pseudoephedrine (CLARITIN-D 24 HOUR)  mg per 24 hr tablet, Take 1 tablet by mouth as needed  , Disp: , Rfl:     naproxen sodium (ALEVE) 220 MG tablet, Take by mouth, Disp: , Rfl:     pantoprazole (PROTONIX) 40 mg tablet, TAKE 1 TABLET BY MOUTH EVERY DAY, Disp: 90 tablet, Rfl: 3    EPINEPHrine (EPIPEN 2-VINCE) 0 3 mg/0 3 mL SOAJ, Inject 0 3 mL (0 3 mg total) into a muscle once for 1 dose, Disp: 0 3 mL, Rfl: 3    EPINEPHrine (EPIPEN) 0 3 mg/0 3 mL SOAJ, Inject 0 3 mL (0 3 mg total) into a muscle once for 1 dose, Disp: 0 3 mL, Rfl: 0  Allergies   Allergen Reactions    Penicillins Throat Swelling     Annotation - [id number]: as achild    Seasonal Ic [Cholestatin]     Sulfites Hives, Rash and Throat Swelling     Vitals:    08/28/19 0755   BP: 142/98   Pulse: 64   Resp: 16   Temp: (!) 97 2 °F (36 2 °C)       Physical Exam   Constitutional: She is oriented to person, place, and time  Vital signs are normal  She appears well-developed and well-nourished  No distress  HENT:   Head: Normocephalic and atraumatic  Neck: Normal range of motion  Cardiovascular: Normal rate, regular rhythm and normal heart sounds  Pulmonary/Chest: Effort normal and breath sounds normal    Bilateral breasts were examined in the sitting and supine position  Left breast surgical scar  There are no masses, skin nodules, nipple changes or nipple discharge  There is no bilateral supraclavicular or axillary lymphadenopathy noted  Abdominal: Soft  Normal appearance  She exhibits no mass  There is no hepatosplenomegaly  There is no tenderness  Musculoskeletal: Normal range of motion  Lymphadenopathy:     She has no axillary adenopathy  Right: No supraclavicular adenopathy present  Left: No supraclavicular adenopathy present  Neurological: She is alert and oriented to person, place, and time  Skin: Skin is warm, dry and intact  No rash noted  She is not diaphoretic  Psychiatric: She has a normal mood and affect  Her speech is normal    Vitals reviewed  Advance Care Planning/Advance Directives:  Discussed disease status and treatment goals with the patient

## 2019-08-28 ENCOUNTER — OFFICE VISIT (OUTPATIENT)
Dept: SURGICAL ONCOLOGY | Facility: CLINIC | Age: 49
End: 2019-08-28
Payer: COMMERCIAL

## 2019-08-28 VITALS
HEART RATE: 64 BPM | TEMPERATURE: 97.2 F | WEIGHT: 200 LBS | SYSTOLIC BLOOD PRESSURE: 142 MMHG | BODY MASS INDEX: 34.15 KG/M2 | DIASTOLIC BLOOD PRESSURE: 98 MMHG | RESPIRATION RATE: 16 BRPM | HEIGHT: 64 IN

## 2019-08-28 DIAGNOSIS — N60.92 ATYPICAL DUCTAL HYPERPLASIA OF LEFT BREAST: Primary | ICD-10-CM

## 2019-08-28 DIAGNOSIS — Z12.31 VISIT FOR SCREENING MAMMOGRAM: ICD-10-CM

## 2019-08-28 PROCEDURE — 99213 OFFICE O/P EST LOW 20 MIN: CPT | Performed by: NURSE PRACTITIONER

## 2019-10-11 DIAGNOSIS — I10 ESSENTIAL HYPERTENSION: ICD-10-CM

## 2019-10-11 RX ORDER — LISINOPRIL 10 MG/1
TABLET ORAL
Qty: 90 TABLET | Refills: 3 | Status: SHIPPED | OUTPATIENT
Start: 2019-10-11 | End: 2020-11-30

## 2019-11-15 DIAGNOSIS — K21.9 GASTROESOPHAGEAL REFLUX DISEASE WITHOUT ESOPHAGITIS: ICD-10-CM

## 2019-11-15 RX ORDER — PANTOPRAZOLE SODIUM 40 MG/1
TABLET, DELAYED RELEASE ORAL
Qty: 90 TABLET | Refills: 3 | Status: SHIPPED | OUTPATIENT
Start: 2019-11-15 | End: 2020-11-30

## 2019-11-25 ENCOUNTER — HOSPITAL ENCOUNTER (OUTPATIENT)
Dept: MAMMOGRAPHY | Facility: HOSPITAL | Age: 49
Discharge: HOME | End: 2019-11-25
Payer: COMMERCIAL

## 2019-11-25 VITALS — WEIGHT: 200 LBS | BODY MASS INDEX: 34.15 KG/M2 | HEIGHT: 64 IN

## 2019-11-25 DIAGNOSIS — Z12.31 VISIT FOR SCREENING MAMMOGRAM: ICD-10-CM

## 2019-11-25 PROCEDURE — 77067 SCR MAMMO BI INCL CAD: CPT

## 2019-11-25 PROCEDURE — 77063 BREAST TOMOSYNTHESIS BI: CPT

## 2020-01-07 DIAGNOSIS — N95.9 PERIMENOPAUSAL DISORDER: ICD-10-CM

## 2020-01-07 RX ORDER — FLUOXETINE 10 MG/1
10 TABLET, FILM COATED ORAL DAILY
Qty: 90 TABLET | Refills: 2 | Status: SHIPPED | OUTPATIENT
Start: 2020-01-07 | End: 2020-01-16

## 2020-01-10 ENCOUNTER — TELEPHONE (OUTPATIENT)
Dept: FAMILY MEDICINE CLINIC | Facility: CLINIC | Age: 50
End: 2020-01-10

## 2020-01-10 NOTE — TELEPHONE ENCOUNTER
Left VM for pt, I called [facility] regarding her Prozac, it requires a prior auth for the new year, which wont get done until Monday   The only option available would be if the patient is willing to pay out of pocket for it, she can use the [facility] devan for discounts

## 2020-01-14 ENCOUNTER — DOCUMENTATION (OUTPATIENT)
Dept: FAMILY MEDICINE CLINIC | Facility: OTHER | Age: 50
End: 2020-01-14

## 2020-01-14 NOTE — TELEPHONE ENCOUNTER
Patient called script line requesting refill for   FLUoxetine (PROzac) 10 MG tablet  Patient states she called Wednesday and Friday last week regarding this  Kaela Segura called patients pharmacy, patients insurance is requiring Prior auth for the new year  Kaela Segura left message for patient that we are waiting for the prior auth to be submitedand she can use [facility] for the time being  Patient states she is out of medication  Prior Tatianna Lazaro was sent from myself to the prior auth team  I will call patient and make her aware again of what is going on and she may have to pay out of pocket since she is out

## 2020-01-14 NOTE — TELEPHONE ENCOUNTER
Contacted the prior auth team (brianna), so she can let me know when Saha Patricio has been submitted  Saha Patricio was submitted to patients insurance, [facility] Mallory Pearson did let me know it takes 24-48 hours for a response  Our  Ned Tilley offered to call patient to let her know what exactly is going on since she is frustrated and offered patient Good Rx prices until Saha Patricio comes through

## 2020-01-14 NOTE — TELEPHONE ENCOUNTER
Patients insurance is requiring a prior auth since it is the new year, please  FLUoxetine (PROzac) 10 MG tablet    Thank you

## 2020-01-16 DIAGNOSIS — F41.9 ANXIETY: Primary | ICD-10-CM

## 2020-01-16 RX ORDER — FLUOXETINE 10 MG/1
10 CAPSULE ORAL DAILY
Qty: 90 CAPSULE | Refills: 3 | Status: SHIPPED | OUTPATIENT
Start: 2020-01-16 | End: 2020-02-14 | Stop reason: SDUPTHER

## 2020-02-14 DIAGNOSIS — F41.9 ANXIETY: ICD-10-CM

## 2020-02-14 RX ORDER — FLUOXETINE 10 MG/1
10 CAPSULE ORAL DAILY
Qty: 30 CAPSULE | Refills: 0 | Status: SHIPPED | OUTPATIENT
Start: 2020-02-14 | End: 2020-11-30

## 2020-03-23 DIAGNOSIS — Z12.11 ENCOUNTER FOR SCREENING FOR MALIGNANT NEOPLASM OF COLON: Primary | ICD-10-CM

## 2020-06-24 ENCOUNTER — TELEPHONE (OUTPATIENT)
Dept: SURGICAL ONCOLOGY | Facility: CLINIC | Age: 50
End: 2020-06-24

## 2020-07-09 ENCOUNTER — TELEPHONE (OUTPATIENT)
Dept: SURGICAL ONCOLOGY | Facility: CLINIC | Age: 50
End: 2020-07-09

## 2020-07-09 NOTE — TELEPHONE ENCOUNTER
----- Message from Tammy Quintana sent at 6/11/2020  2:01 PM EDT -----  Regarding: Today's 1:30pm No Show  Contact: [phone]  Called Kristi no response, left her a voice message to reschedule

## 2020-10-08 ENCOUNTER — TELEPHONE (OUTPATIENT)
Dept: GASTROENTEROLOGY | Facility: CLINIC | Age: 50
End: 2020-10-08

## 2020-11-28 DIAGNOSIS — K21.9 GASTROESOPHAGEAL REFLUX DISEASE WITHOUT ESOPHAGITIS: ICD-10-CM

## 2020-11-28 DIAGNOSIS — F41.9 ANXIETY: ICD-10-CM

## 2020-11-28 DIAGNOSIS — I10 ESSENTIAL HYPERTENSION: ICD-10-CM

## 2020-11-30 DIAGNOSIS — I10 ESSENTIAL HYPERTENSION: Primary | ICD-10-CM

## 2020-11-30 RX ORDER — LISINOPRIL 10 MG/1
TABLET ORAL
Qty: 90 TABLET | Refills: 3 | Status: SHIPPED | OUTPATIENT
Start: 2020-11-30 | End: 2021-10-23

## 2020-11-30 RX ORDER — FLUOXETINE 10 MG/1
CAPSULE ORAL
Qty: 90 CAPSULE | Refills: 3 | Status: SHIPPED | OUTPATIENT
Start: 2020-11-30 | End: 2021-09-14

## 2020-11-30 RX ORDER — PANTOPRAZOLE SODIUM 40 MG/1
TABLET, DELAYED RELEASE ORAL
Qty: 90 TABLET | Refills: 3 | Status: SHIPPED | OUTPATIENT
Start: 2020-11-30 | End: 2021-11-29

## 2020-12-22 ENCOUNTER — OFFICE VISIT (OUTPATIENT)
Dept: URGENT CARE | Facility: CLINIC | Age: 50
End: 2020-12-22
Payer: COMMERCIAL

## 2020-12-22 VITALS
HEIGHT: 64 IN | SYSTOLIC BLOOD PRESSURE: 144 MMHG | TEMPERATURE: 98.5 F | BODY MASS INDEX: 37.05 KG/M2 | DIASTOLIC BLOOD PRESSURE: 78 MMHG | HEART RATE: 82 BPM | RESPIRATION RATE: 16 BRPM | WEIGHT: 217 LBS

## 2020-12-22 DIAGNOSIS — J06.9 VIRAL UPPER RESPIRATORY TRACT INFECTION: ICD-10-CM

## 2020-12-22 DIAGNOSIS — J01.10 ACUTE FRONTAL SINUSITIS, RECURRENCE NOT SPECIFIED: Primary | ICD-10-CM

## 2020-12-22 PROCEDURE — 99204 OFFICE O/P NEW MOD 45 MIN: CPT | Performed by: NURSE PRACTITIONER

## 2020-12-22 PROCEDURE — 99284 EMERGENCY DEPT VISIT MOD MDM: CPT | Performed by: NURSE PRACTITIONER

## 2020-12-22 PROCEDURE — U0003 INFECTIOUS AGENT DETECTION BY NUCLEIC ACID (DNA OR RNA); SEVERE ACUTE RESPIRATORY SYNDROME CORONAVIRUS 2 (SARS-COV-2) (CORONAVIRUS DISEASE [COVID-19]), AMPLIFIED PROBE TECHNIQUE, MAKING USE OF HIGH THROUGHPUT TECHNOLOGIES AS DESCRIBED BY CMS-2020-01-R: HCPCS

## 2020-12-22 PROCEDURE — G0383 LEV 4 HOSP TYPE B ED VISIT: HCPCS | Performed by: NURSE PRACTITIONER

## 2020-12-22 RX ORDER — DOXYCYCLINE 100 MG/1
100 TABLET ORAL 2 TIMES DAILY
Qty: 14 TABLET | Refills: 0 | Status: SHIPPED | OUTPATIENT
Start: 2020-12-22 | End: 2020-12-29

## 2020-12-24 LAB — SARS-COV-2 RNA SPEC QL NAA+PROBE: NOT DETECTED

## 2021-04-13 DIAGNOSIS — Z23 ENCOUNTER FOR IMMUNIZATION: ICD-10-CM

## 2021-04-23 ENCOUNTER — IMMUNIZATIONS (OUTPATIENT)
Dept: FAMILY MEDICINE CLINIC | Facility: HOSPITAL | Age: 51
End: 2021-04-23

## 2021-04-23 DIAGNOSIS — Z23 ENCOUNTER FOR IMMUNIZATION: Primary | ICD-10-CM

## 2021-04-23 PROCEDURE — 91300 SARS-COV-2 / COVID-19 MRNA VACCINE (PFIZER-BIONTECH) 30 MCG: CPT

## 2021-04-23 PROCEDURE — 0001A SARS-COV-2 / COVID-19 MRNA VACCINE (PFIZER-BIONTECH) 30 MCG: CPT

## 2021-05-14 ENCOUNTER — IMMUNIZATIONS (OUTPATIENT)
Dept: FAMILY MEDICINE CLINIC | Facility: HOSPITAL | Age: 51
End: 2021-05-14

## 2021-05-14 DIAGNOSIS — Z23 ENCOUNTER FOR IMMUNIZATION: Primary | ICD-10-CM

## 2021-05-14 PROCEDURE — 0002A SARS-COV-2 / COVID-19 MRNA VACCINE (PFIZER-BIONTECH) 30 MCG: CPT

## 2021-05-14 PROCEDURE — 91300 SARS-COV-2 / COVID-19 MRNA VACCINE (PFIZER-BIONTECH) 30 MCG: CPT

## 2021-08-31 ENCOUNTER — TELEPHONE (OUTPATIENT)
Dept: GASTROENTEROLOGY | Facility: CLINIC | Age: 51
End: 2021-08-31

## 2021-08-31 NOTE — TELEPHONE ENCOUNTER
Received order from Dr Domingo Hu to call and schedule new patient colon screening  I left message for patient to call and schedule and will call again in 1 wk if she doesn't return call

## 2021-09-07 NOTE — TELEPHONE ENCOUNTER
Left another message for patient to call and schedule per Dr Chloe Fu  Will call again in 2 weeks if she doesn't return call to schedule

## 2021-09-09 ENCOUNTER — TELEPHONE (OUTPATIENT)
Dept: FAMILY MEDICINE CLINIC | Facility: CLINIC | Age: 51
End: 2021-09-09

## 2021-09-09 NOTE — TELEPHONE ENCOUNTER
PT IS AWARE YOU ARE NOT BACK UNTIL Monday Pt is asking if you can squeeze her in also if you can get her daughter Del Evans in ( I sent a message on her too) pls advise

## 2021-09-14 ENCOUNTER — OFFICE VISIT (OUTPATIENT)
Dept: FAMILY MEDICINE CLINIC | Facility: CLINIC | Age: 51
End: 2021-09-14
Payer: COMMERCIAL

## 2021-09-14 VITALS
HEIGHT: 63 IN | WEIGHT: 215.4 LBS | OXYGEN SATURATION: 98 % | TEMPERATURE: 97 F | HEART RATE: 63 BPM | BODY MASS INDEX: 38.16 KG/M2 | SYSTOLIC BLOOD PRESSURE: 120 MMHG | DIASTOLIC BLOOD PRESSURE: 80 MMHG | RESPIRATION RATE: 12 BRPM

## 2021-09-14 DIAGNOSIS — Z91.018 FOOD ALLERGY: ICD-10-CM

## 2021-09-14 DIAGNOSIS — I10 BENIGN ESSENTIAL HYPERTENSION: ICD-10-CM

## 2021-09-14 DIAGNOSIS — E66.9 OBESITY (BMI 30-39.9): Primary | ICD-10-CM

## 2021-09-14 DIAGNOSIS — Z12.31 VISIT FOR SCREENING MAMMOGRAM: ICD-10-CM

## 2021-09-14 DIAGNOSIS — Z11.4 ENCOUNTER FOR SCREENING FOR HIV: ICD-10-CM

## 2021-09-14 DIAGNOSIS — Z11.59 NEED FOR HEPATITIS C SCREENING TEST: ICD-10-CM

## 2021-09-14 DIAGNOSIS — Z00.00 ANNUAL PHYSICAL EXAM: ICD-10-CM

## 2021-09-14 DIAGNOSIS — F41.9 ANXIETY: ICD-10-CM

## 2021-09-14 PROCEDURE — 99396 PREV VISIT EST AGE 40-64: CPT | Performed by: FAMILY MEDICINE

## 2021-09-14 RX ORDER — EPINEPHRINE 0.3 MG/.3ML
0.3 INJECTION SUBCUTANEOUS ONCE
Qty: 0.6 ML | Refills: 0 | Status: SHIPPED | OUTPATIENT
Start: 2021-09-14 | End: 2021-09-14

## 2021-09-14 RX ORDER — FLUOXETINE HYDROCHLORIDE 20 MG/1
20 CAPSULE ORAL DAILY
Qty: 90 CAPSULE | Refills: 0
Start: 2021-09-14 | End: 2021-10-25

## 2021-09-14 NOTE — PROGRESS NOTES
[address]    NAME: Ayaka Beckwith  AGE: 45 y o  SEX: female  : 1970     DATE: 2021     Assessment and Plan:     Problem List Items Addressed This Visit        Cardiovascular and Mediastinum    Benign essential hypertension    Relevant Medications    EPINEPHrine (EPIPEN) 0 3 mg/0 3 mL SOAJ    Other Relevant Orders    CBC and differential    Comprehensive metabolic panel    Lipid Panel with Direct LDL reflex       Other    Anxiety    Relevant Medications    FLUoxetine (PROzac) 20 mg capsule    Obesity (BMI 30-39 9) - Primary      Other Visit Diagnoses     Annual physical exam        Food allergy        Relevant Medications    EPINEPHrine (EPIPEN) 0 3 mg/0 3 mL SOAJ    Visit for screening mammogram        Relevant Orders    Mammo screening bilateral w 3d & cad    Encounter for screening for HIV        Relevant Orders    HIV 1/2 Antigen/Antibody (4th Generation) w Reflex [facility]    Need for hepatitis C screening test        Relevant Orders    Hepatitis C antibody          Immunizations and preventive care screenings were discussed with patient today  Appropriate education was printed on patient's after visit summary  Counseling:  Alcohol/drug use: discussed moderation in alcohol intake, the recommendations for healthy alcohol use, and avoidance of illicit drug use  Dental Health: discussed importance of regular tooth brushing, flossing, and dental visits  Injury prevention: discussed safety/seat belts, safety helmets, smoke detectors, carbon dioxide detectors, and smoking near bedding or upholstery  Sexual health: discussed sexually transmitted diseases, partner selection, use of condoms, avoidance of unintended pregnancy, and contraceptive alternatives  · Exercise: the importance of regular exercise/physical activity was discussed  Recommend exercise 3-5 times per week for at least 30 minutes  BMI Counseling:  Body mass index is 38 02 kg/m²  The BMI is above normal  Nutrition recommendations include decreasing portion sizes and encouraging healthy choices of fruits and vegetables  Exercise recommendations include moderate physical activity 150 minutes/week  No pharmacotherapy was ordered  No follow-ups on file  Chief Complaint:     Chief Complaint   Patient presents with    Physical Exam     Patient is here today for an annaul exam       History of Present Illness:     Adult Annual Physical   Patient here for a comprehensive physical exam  The patient reports no problems  Diet and Physical Activity  · Diet/Nutrition: consuming 3-5 servings of fruits/vegetables daily  · Exercise: walking  Depression Screening  PHQ-9 Depression Screening    PHQ-9:   Frequency of the following problems over the past two weeks:      Little interest or pleasure in doing things: 0 - not at all  Feeling down, depressed, or hopeless: 0 - not at all  PHQ-2 Score: 0       General Health  · Sleep: sleeps well and gets 7-8 hours of sleep on average  · Hearing: normal - bilateral   · Vision: most recent eye exam >1 year ago and wears contacts  · Dental: no dental visits for >1 year and brushes teeth twice daily  /GYN Health  · Patient is: postmenopausal  · Last menstrual period: 2 years ago  · Contraceptive method: none  Review of Systems:     Review of Systems   Constitutional: Negative  HENT: Negative  Eyes: Negative  Respiratory: Negative  Cardiovascular: Negative  Gastrointestinal: Negative  Endocrine: Negative  Genitourinary: Negative  Musculoskeletal: Negative  Skin: Negative  Allergic/Immunologic: Negative  Neurological: Negative  Hematological: Negative  Psychiatric/Behavioral: Negative         Past Medical History:     Past Medical History:   Diagnosis Date    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    Anxiety     Cancer ([facility] [address] ) 2009    tongue     GERD (gastroesophageal reflux disease)     Herpes zoster     last Assessed 2016    Hypertension       Past Surgical History:     Past Surgical History:   Procedure Laterality Date    BREAST LUMPECTOMY Left 2019    Procedure: BREAST LUMPECTOMY; BREAST NEEDLE LOCALIZATION (NEEDLE LOC AT 1130); Surgeon: Antonietta Stearns MD;  [facility];  Service: Surgical Oncology     SECTION      GLOSSECTOMY Bilateral      Complete with Uniliateral Radical Neck Disscetion    MAMMO NEEDLE LOCALIZATION LEFT (ALL INC) Left 2019    MAMMO STEREOTACTIC BREAST BIOPSY LEFT (ALL INC) Left 2018    TONGUE SURGERY      BX tongue location base      Social History:     Social History     Socioeconomic History    Marital status: /Civil Union     Spouse name: None    Number of children: None    Years of education: None    Highest education level: None   Occupational History    None   Tobacco Use    Smoking status: Never Smoker    Smokeless tobacco: Never Used   Substance and Sexual Activity    Alcohol use: Yes     Comment: Social    Drug use: No    Sexual activity: None   Other Topics Concern    None   Social History Narrative    Drinks Tea     Social Determinants of Health     Financial Resource Strain:     Difficulty of Paying Living Expenses:    Food Insecurity:     Worried About Running Out of Food in the Last Year:     Ran Out of Food in the Last Year:    Transportation Needs:     Lack of Transportation (Medical):      Lack of Transportation (Non-Medical):    Physical Activity:     Days of Exercise per Week:     Minutes of Exercise per Session:    Stress:     Feeling of Stress :    Social Connections:     Frequency of Communication with Friends and Family:     Frequency of Social Gatherings with Friends and Family:     Attends Zoroastrian Services:     Active Member of Clubs or Organizations:     Attends Club or Organization Meetings:     Marital Status:    Intimate Partner Violence:  Fear of Current or Ex-Partner:     Emotionally Abused:     Physically Abused:     Sexually Abused:       Family History:     Family History   Problem Relation Age of Onset    Diabetes Mother     Heart disease Father     Skin cancer Father 61    Breast cancer Paternal Aunt 48    Stomach cancer Maternal Grandmother 80    Prostate cancer Maternal Grandfather 80    Breast cancer Paternal Aunt 48    No Known Problems Daughter     No Known Problems Paternal Grandmother     No Known Problems Paternal Grandfather       Current Medications:     Current Outpatient Medications   Medication Sig Dispense Refill    EPINEPHrine (EPIPEN) 0 3 mg/0 3 mL SOAJ Inject 0 3 mL (0 3 mg total) into a muscle once for 1 dose 0 6 mL 0    FLUoxetine (PROzac) 20 mg capsule Take 1 capsule (20 mg total) by mouth daily 90 capsule 0    fluticasone (FLONASE) 50 mcg/act nasal spray SPRAY 2 SPRAYS INTO EACH NOSTRIL EVERY DAY 16 mL 0    lisinopril (ZESTRIL) 10 mg tablet TAKE 1 TABLET BY MOUTH ONCE DAILY 90 tablet 3    loratadine-pseudoephedrine (CLARITIN-D 24 HOUR)  mg per 24 hr tablet Take 1 tablet by mouth as needed        naproxen sodium (ALEVE) 220 MG tablet Take by mouth      pantoprazole (PROTONIX) 40 mg tablet TAKE 1 TABLET BY MOUTH EVERY DAY 90 tablet 3     No current facility-administered medications for this visit  Allergies: Allergies   Allergen Reactions    Penicillins Throat Swelling     Annotation - [id number]: as achild    Seasonal Ic [Cholestatin]     Sulfites - Food Allergy Hives, Rash and Throat Swelling      Physical Exam:     /80 (BP Location: Left arm, Patient Position: Sitting, Cuff Size: Large)   Pulse 63   Temp (!) 97 °F (36 1 °C) (Tympanic)   Resp 12   Ht 5' 3 11" (1 603 m)   Wt 97 7 kg (215 lb 6 4 oz)   SpO2 98%   BMI 38 02 kg/m²     Physical Exam  Constitutional:       Appearance: She is well-developed  HENT:      Head: Normocephalic and atraumatic        Right Ear: Tympanic membrane normal       Nose: Nose normal       Mouth/Throat:      Mouth: Mucous membranes are moist    Eyes:      Pupils: Pupils are equal, round, and reactive to light  Cardiovascular:      Rate and Rhythm: Normal rate and regular rhythm  Pulmonary:      Effort: Pulmonary effort is normal  No respiratory distress  Breath sounds: Normal breath sounds  No wheezing  Abdominal:      General: Bowel sounds are normal       Palpations: Abdomen is soft  Musculoskeletal:         General: No deformity  Normal range of motion  Cervical back: Normal range of motion and neck supple  Skin:     General: Skin is warm  Capillary Refill: Capillary refill takes less than 2 seconds  Neurological:      General: No focal deficit present  Mental Status: She is alert and oriented to person, place, and time     Psychiatric:         Mood and Affect: Mood normal          Behavior: Behavior normal           Ky Reina MD  [id number] [facility]

## 2021-09-14 NOTE — PATIENT INSTRUCTIONS

## 2021-09-15 ENCOUNTER — PREP FOR PROCEDURE (OUTPATIENT)
Dept: GASTROENTEROLOGY | Facility: CLINIC | Age: 51
End: 2021-09-15

## 2021-09-15 ENCOUNTER — TELEPHONE (OUTPATIENT)
Dept: GASTROENTEROLOGY | Facility: CLINIC | Age: 51
End: 2021-09-15

## 2021-09-15 DIAGNOSIS — Z12.11 SCREENING FOR COLON CANCER: Primary | ICD-10-CM

## 2021-09-15 NOTE — TELEPHONE ENCOUNTER
09/15/21  Screened by: Marina Gallardo    Referring Provider Dr Jesus Adams: There is no height or weight on file to calculate BMI  Has patient been referred for a routine screening Colonoscopy? yes  Is the patient between 39-70 years old? yes      Previous Colonoscopy no   If yes:    Date:na  Facility: na    Reason: na      SCHEDULING STAFF: If the patient is between 45yrs-49yrs, please advise patient to confirm benefits/coverage with their insurance company for a routine screening colonoscopy, some insurance carriers will only cover at [phone] or older  If the patient is over 66years old, please schedule an office visit  Does the patient want to see a Gastroenterologist prior to their procedure OR are they having any GI symptoms? no    Has the patient been hospitalized or had abdominal surgery in the past 6 months? no    Does the patient use supplemental oxygen? no    Does the patient take Coumadin, Lovenox, Plavix, Elliquis, Xarelto, or other blood thinning medication? no    Has the patient had a stroke, cardiac event, or stent placed in the past year? no    SCHEDULING STAFF: If patient answers NO to above questions, then schedule procedure  If patient answers YES to above questions, then schedule office appointment  If patient is between 45yrs - 49yrs, please advise patient that we will have to confirm benefits & coverage with their insurance company for a routine screening colonoscopy

## 2021-09-17 ENCOUNTER — HOSPITAL ENCOUNTER (OUTPATIENT)
Dept: MAMMOGRAPHY | Facility: MEDICAL CENTER | Age: 51
Discharge: HOME | End: 2021-09-17
Payer: COMMERCIAL

## 2021-09-17 VITALS — WEIGHT: 215 LBS | HEIGHT: 63 IN | BODY MASS INDEX: 38.09 KG/M2

## 2021-09-17 DIAGNOSIS — Z12.31 VISIT FOR SCREENING MAMMOGRAM: ICD-10-CM

## 2021-09-17 PROCEDURE — 77067 SCR MAMMO BI INCL CAD: CPT

## 2021-09-17 PROCEDURE — 77063 BREAST TOMOSYNTHESIS BI: CPT

## 2021-09-29 ENCOUNTER — TELEPHONE (OUTPATIENT)
Dept: GASTROENTEROLOGY | Facility: HOSPITAL | Age: 51
End: 2021-09-29

## 2021-10-08 ENCOUNTER — APPOINTMENT (OUTPATIENT)
Dept: LAB | Facility: CLINIC | Age: 51
End: 2021-10-08
Payer: COMMERCIAL

## 2021-10-08 DIAGNOSIS — Z11.4 ENCOUNTER FOR SCREENING FOR HIV: ICD-10-CM

## 2021-10-08 DIAGNOSIS — I10 ESSENTIAL HYPERTENSION: ICD-10-CM

## 2021-10-08 DIAGNOSIS — Z11.59 NEED FOR HEPATITIS C SCREENING TEST: ICD-10-CM

## 2021-10-08 DIAGNOSIS — I10 BENIGN ESSENTIAL HYPERTENSION: ICD-10-CM

## 2021-10-08 LAB
ALBUMIN SERPL BCP-MCNC: 3.7 G/DL (ref 3.5–5)
ALP SERPL-CCNC: 65 U/L (ref 46–116)
ALT SERPL W P-5'-P-CCNC: 28 U/L (ref 12–78)
ANION GAP SERPL CALCULATED.3IONS-SCNC: 8 MMOL/L (ref 4–13)
AST SERPL W P-5'-P-CCNC: 22 U/L (ref 5–45)
BASOPHILS # BLD AUTO: 0.03 THOUSANDS/ΜL (ref 0–0.1)
BASOPHILS NFR BLD AUTO: 1 % (ref 0–1)
BILIRUB SERPL-MCNC: 0.78 MG/DL (ref 0.2–1)
BUN SERPL-MCNC: 14 MG/DL (ref 5–25)
CALCIUM SERPL-MCNC: 9 MG/DL (ref 8.3–10.1)
CHLORIDE SERPL-SCNC: 103 MMOL/L (ref 100–108)
CHOLEST SERPL-MCNC: 175 MG/DL (ref 50–200)
CO2 SERPL-SCNC: 29 MMOL/L (ref 21–32)
CREAT SERPL-MCNC: 0.74 MG/DL (ref 0.6–1.3)
EOSINOPHIL # BLD AUTO: 0.26 THOUSAND/ΜL (ref 0–0.61)
EOSINOPHIL NFR BLD AUTO: 4 % (ref 0–6)
ERYTHROCYTE [DISTWIDTH] IN BLOOD BY AUTOMATED COUNT: 13.7 % (ref 11.6–15.1)
GFR SERPL CREATININE-BSD FRML MDRD: 93 ML/MIN/1.73SQ M
GLUCOSE P FAST SERPL-MCNC: 99 MG/DL (ref 65–99)
HCT VFR BLD AUTO: 41.6 % (ref 34.8–46.1)
HCV AB SER QL: NORMAL
HDLC SERPL-MCNC: 56 MG/DL
HGB BLD-MCNC: 13.3 G/DL (ref 11.5–15.4)
IMM GRANULOCYTES # BLD AUTO: 0.01 THOUSAND/UL (ref 0–0.2)
IMM GRANULOCYTES NFR BLD AUTO: 0 % (ref 0–2)
LDLC SERPL CALC-MCNC: 106 MG/DL (ref 0–100)
LYMPHOCYTES # BLD AUTO: 1.62 THOUSANDS/ΜL (ref 0.6–4.47)
LYMPHOCYTES NFR BLD AUTO: 25 % (ref 14–44)
MCH RBC QN AUTO: 29 PG (ref 26.8–34.3)
MCHC RBC AUTO-ENTMCNC: 32 G/DL (ref 31.4–37.4)
MCV RBC AUTO: 91 FL (ref 82–98)
MONOCYTES # BLD AUTO: 0.59 THOUSAND/ΜL (ref 0.17–1.22)
MONOCYTES NFR BLD AUTO: 9 % (ref 4–12)
NEUTROPHILS # BLD AUTO: 4.08 THOUSANDS/ΜL (ref 1.85–7.62)
NEUTS SEG NFR BLD AUTO: 61 % (ref 43–75)
NRBC BLD AUTO-RTO: 0 /100 WBCS
PLATELET # BLD AUTO: 254 THOUSANDS/UL (ref 149–390)
PMV BLD AUTO: 11 FL (ref 8.9–12.7)
POTASSIUM SERPL-SCNC: 4 MMOL/L (ref 3.5–5.3)
PROT SERPL-MCNC: 7.5 G/DL (ref 6.4–8.2)
RBC # BLD AUTO: 4.58 MILLION/UL (ref 3.81–5.12)
SODIUM SERPL-SCNC: 140 MMOL/L (ref 136–145)
TRIGL SERPL-MCNC: 65 MG/DL
WBC # BLD AUTO: 6.59 THOUSAND/UL (ref 4.31–10.16)

## 2021-10-08 PROCEDURE — 85025 COMPLETE CBC W/AUTO DIFF WBC: CPT | Performed by: FAMILY MEDICINE

## 2021-10-08 PROCEDURE — 80053 COMPREHEN METABOLIC PANEL: CPT | Performed by: FAMILY MEDICINE

## 2021-10-08 PROCEDURE — 80061 LIPID PANEL: CPT

## 2021-10-08 PROCEDURE — 87389 HIV-1 AG W/HIV-1&-2 AB AG IA: CPT

## 2021-10-08 PROCEDURE — 36415 COLL VENOUS BLD VENIPUNCTURE: CPT | Performed by: FAMILY MEDICINE

## 2021-10-08 PROCEDURE — 86803 HEPATITIS C AB TEST: CPT

## 2021-10-10 LAB — HIV 1+2 AB+HIV1 P24 AG SERPL QL IA: NORMAL

## 2021-10-23 DIAGNOSIS — I10 ESSENTIAL HYPERTENSION: ICD-10-CM

## 2021-10-23 DIAGNOSIS — F41.9 ANXIETY: ICD-10-CM

## 2021-10-23 RX ORDER — LISINOPRIL 10 MG/1
TABLET ORAL
Qty: 90 TABLET | Refills: 3 | Status: SHIPPED | OUTPATIENT
Start: 2021-10-23

## 2021-10-23 RX ORDER — FLUOXETINE 10 MG/1
CAPSULE ORAL
Qty: 90 CAPSULE | Refills: 3 | Status: SHIPPED | OUTPATIENT
Start: 2021-10-23 | End: 2021-10-25

## 2021-10-25 DIAGNOSIS — F41.9 ANXIETY: ICD-10-CM

## 2021-10-25 RX ORDER — FLUOXETINE HYDROCHLORIDE 20 MG/1
20 CAPSULE ORAL DAILY
Qty: 90 CAPSULE | Refills: 0 | Status: CANCELLED
Start: 2021-10-25

## 2021-11-15 ENCOUNTER — TELEPHONE (OUTPATIENT)
Dept: CARDIAC SURGERY | Facility: CLINIC | Age: 51
End: 2021-11-15

## 2021-11-23 ENCOUNTER — TELEPHONE (OUTPATIENT)
Dept: SURGICAL ONCOLOGY | Facility: CLINIC | Age: 51
End: 2021-11-23

## 2021-11-29 DIAGNOSIS — K21.9 GASTROESOPHAGEAL REFLUX DISEASE WITHOUT ESOPHAGITIS: ICD-10-CM

## 2021-11-29 RX ORDER — PANTOPRAZOLE SODIUM 40 MG/1
TABLET, DELAYED RELEASE ORAL
Qty: 30 TABLET | Refills: 11 | Status: SHIPPED | OUTPATIENT
Start: 2021-11-29

## 2022-01-16 DIAGNOSIS — F41.9 ANXIETY: ICD-10-CM

## 2022-01-16 RX ORDER — FLUOXETINE HYDROCHLORIDE 20 MG/1
CAPSULE ORAL
Qty: 90 CAPSULE | Refills: 0 | Status: SHIPPED | OUTPATIENT
Start: 2022-01-16 | End: 2022-04-19

## 2022-04-19 DIAGNOSIS — F41.9 ANXIETY: ICD-10-CM

## 2022-04-19 RX ORDER — FLUOXETINE HYDROCHLORIDE 20 MG/1
CAPSULE ORAL
Qty: 90 CAPSULE | Refills: 0 | Status: SHIPPED | OUTPATIENT
Start: 2022-04-19 | End: 2022-07-18

## 2022-05-12 ENCOUNTER — VBI (OUTPATIENT)
Dept: ADMINISTRATIVE | Facility: OTHER | Age: 52
End: 2022-05-12

## 2022-07-18 DIAGNOSIS — F41.9 ANXIETY: ICD-10-CM

## 2022-07-18 RX ORDER — FLUOXETINE HYDROCHLORIDE 20 MG/1
CAPSULE ORAL
Qty: 90 CAPSULE | Refills: 0 | Status: SHIPPED | OUTPATIENT
Start: 2022-07-18 | End: 2022-10-15

## 2022-08-11 ENCOUNTER — VBI (OUTPATIENT)
Dept: ADMINISTRATIVE | Facility: OTHER | Age: 52
End: 2022-08-11

## 2022-08-29 ENCOUNTER — OFFICE VISIT (OUTPATIENT)
Dept: FAMILY MEDICINE CLINIC | Facility: CLINIC | Age: 52
End: 2022-08-29
Payer: COMMERCIAL

## 2022-08-29 ENCOUNTER — HOSPITAL ENCOUNTER (OUTPATIENT)
Dept: RADIOLOGY | Facility: HOSPITAL | Age: 52
Discharge: HOME | End: 2022-08-29
Attending: FAMILY MEDICINE
Payer: COMMERCIAL

## 2022-08-29 VITALS
BODY MASS INDEX: 38.13 KG/M2 | HEIGHT: 63 IN | DIASTOLIC BLOOD PRESSURE: 88 MMHG | HEART RATE: 61 BPM | SYSTOLIC BLOOD PRESSURE: 110 MMHG | WEIGHT: 215.2 LBS | TEMPERATURE: 98.7 F | RESPIRATION RATE: 16 BRPM | OXYGEN SATURATION: 98 %

## 2022-08-29 DIAGNOSIS — M25.569 ACUTE KNEE PAIN, UNSPECIFIED LATERALITY: ICD-10-CM

## 2022-08-29 DIAGNOSIS — M25.569 ACUTE KNEE PAIN, UNSPECIFIED LATERALITY: Primary | ICD-10-CM

## 2022-08-29 PROCEDURE — 73562 X-RAY EXAM OF KNEE 3: CPT

## 2022-08-29 PROCEDURE — 99213 OFFICE O/P EST LOW 20 MIN: CPT | Performed by: FAMILY MEDICINE

## 2022-08-29 RX ORDER — NAPROXEN 500 MG/1
500 TABLET ORAL 2 TIMES DAILY WITH MEALS
Qty: 40 TABLET | Refills: 0 | Status: SHIPPED | OUTPATIENT
Start: 2022-08-29 | End: 2022-09-22 | Stop reason: ALTCHOICE
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
## 2022-09-22 ENCOUNTER — OFFICE VISIT (OUTPATIENT)
Dept: OBGYN CLINIC | Facility: CLINIC | Age: 52
End: 2022-09-22
Payer: COMMERCIAL

## 2022-09-22 VITALS
BODY MASS INDEX: 38.09 KG/M2 | HEIGHT: 63 IN | SYSTOLIC BLOOD PRESSURE: 128 MMHG | HEART RATE: 55 BPM | DIASTOLIC BLOOD PRESSURE: 76 MMHG | WEIGHT: 215 LBS

## 2022-09-22 DIAGNOSIS — M17.11 PATELLOFEMORAL ARTHRITIS OF RIGHT KNEE: Primary | ICD-10-CM

## 2022-09-22 DIAGNOSIS — M25.569 ACUTE KNEE PAIN, UNSPECIFIED LATERALITY: ICD-10-CM

## 2022-09-22 PROCEDURE — 99242 OFF/OP CONSLTJ NEW/EST SF 20: CPT | Performed by: ORTHOPAEDIC SURGERY

## 2022-09-22 NOTE — PATIENT INSTRUCTIONS
PATELLOFEMORAL SYNDROME-Shetty TAPING TECHNIQUE    Search Leukotape P tape and Cover roll stretch tape on  [employer]  Leukotape P is typically 1 5in x 15 yards, Cover roll stretch tape is typically 2in x 10 yards        How to apply:  Place cover roll tape over knee cap  This protects the skin  Apply Leukotape over the cover roll tape  Use the Leukotape to pull the knee cap to the middle of the body (medial side of knee) to prevent lateral (outside) tracking of the knee cap  Wear the tape for 3 days (72 hrs) straight, then take off one day (24 hrs) off, then repeat  Visit youtube  com and search Shetty tape for the knee to watch a video on how to apply tape  Video titled Shetty Taping of the knee created by [employer] recommended  What does Shetty taping technique do? Patellofemoral syndrome is when the inside quadriceps muscle, called the VMO muscle, becomes weak due to a number of factors  This causes the Patellofemoral ligament, which is the only ligament holding the patella (knee cap) in place, to become weak as well  When the ligament becomes weak, the knee cap drifts or tracks too far to the lateral side of the knee (outside knee) which causes tension on this ligament  The knee cap hits the lateral area of the femur, resulting in pain or discomfort around the front of the knee  Physical Therapy is sometimes used to strengthen the weak muscles, such as the VMO, to correct this problem  When the tape is applied correctly, it helps to realign the knee cap to the center of the knee  This helps correct for the lateral tracking of the knee cap and relieve discomfort  You can search online for exercises that can help strengthen the VMO quadriceps muscle or attend physical therapy

## 2022-09-22 NOTE — PROGRESS NOTES
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 ([facility] [address]    Scar condition and fibrosis of skin    Secondary and unspecified malignant neoplasm of lymph nodes of head, face, and neck    Elevated blood sugar    Obesity (BMI 30-39  9)    Patellofemoral arthritis of right knee       Plan:    47 y o  female with right knee patellofemoral arthritis     Patient advised in [place] taping technique   Given that the patient's pain has improved tremendously do not advise any further intervention at this time might consider Visco cortisone injections in the future   Follow-up p r n  The patient was seen and examined by Dr Brea Mills and myself  The assessment and plan were formulated by Dr Brea Mills and I assisted in carrying it out  Subjective:   Patient ID: German Silverio is a 47 y o  female   HPI    Patient comes in today with regards to right knee pain  Patient is referred to us by Eduin Butt DO for further evaluation  The patient reports that the pain been going on for 1 month  Injury or trauma prior to onset of pain:  Was kneeling on the knee for an extended time while cleaning  Pain is located in the anterior knee  The pain is described as aching  They report the pain is not constant  The pain does not radiate   It is worsened with ambulation, and is made better with rest   Treatments tried:  Naproxen  Old injuries or prior surgeries:  None        The following portions of the patient's history were reviewed and updated as appropriate: allergies, current medications, past family history, past social history, past surgical history and problem list     Social History     Socioeconomic History    Marital status: /Civil Union     Spouse name: Not on file    Number of children: Not on file    Years of education: Not on file    Highest education level: Not on file   Occupational History    Not on file   Tobacco Use    Smoking status: Never Smoker    Smokeless tobacco: Never Used   Vaping Use    Vaping Use: Never used   Substance and Sexual Activity    Alcohol use: Yes     Comment: Social    Drug use: No    Sexual activity: Not on file   Other Topics Concern    Not on file   Social History Narrative    Drinks Tea     Social Determinants of Health     Financial Resource Strain: Not on file   Food Insecurity: Not on file   Transportation Needs: Not on file   Physical Activity: Not on file   Stress: Not on file   Social Connections: Not on file   Intimate Partner Violence: Not on file   Housing Stability: Not on file     Past Medical History:   Diagnosis Date    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    Anxiety     Cancer ([facility] [address]     tongue     GERD (gastroesophageal reflux disease)     Herpes zoster     last Assessed 2016    Hypertension     Shingles      Past Surgical History:   Procedure Laterality Date    BREAST LUMPECTOMY Left 2019    Procedure: BREAST LUMPECTOMY; BREAST NEEDLE LOCALIZATION (NEEDLE LOC AT 1130);   Surgeon: Liliam Ferrer MD;  [facility];  Service: Surgical Oncology     SECTION      GLOSSECTOMY Bilateral      Complete with Uniliateral Radical Neck Disscetion    MAMMO NEEDLE LOCALIZATION LEFT (ALL INC) Left 2019    MAMMO STEREOTACTIC BREAST BIOPSY LEFT ([employer]) Left 2018    TONGUE SURGERY      BX tongue location base     Allergies   Allergen Reactions    Penicillins Throat Swelling     Annotation - [id number]: as achild    Seasonal Ic [Cholestatin]     Sulfites - Food Allergy Hives, Rash and Throat Swelling     Current Outpatient Medications on File Prior to Visit   Medication Sig Dispense Refill    FLUoxetine (PROzac) 20 mg capsule TAKE 1 CAPSULE BY MOUTH EVERY DAY 90 capsule 0    fluticasone (FLONASE) 50 mcg/act nasal spray SPRAY 2 SPRAYS INTO EACH NOSTRIL EVERY DAY 16 mL 0    lisinopril (ZESTRIL) 10 mg tablet TAKE 1 TABLET BY MOUTH ONCE DAILY 90 tablet 3    pantoprazole (PROTONIX) 40 mg tablet TAKE 1 TABLET BY MOUTH EVERY DAY 30 tablet 11    [DISCONTINUED] naproxen (Naprosyn) 500 mg tablet Take 1 tablet (500 mg total) by mouth 2 (two) times a day with meals as needed  40 tablet 0    EPINEPHrine (EPIPEN) 0 3 mg/0 3 mL SOAJ Inject 0 3 mL (0 3 mg total) into a muscle once for 1 dose 0 6 mL 0    loratadine-pseudoephedrine (CLARITIN-D 24-HOUR)  mg per 24 hr tablet Take 1 tablet by mouth as needed   (Patient not taking: No sig reported)       No current facility-administered medications on file prior to visit  Review of Systems   Constitutional: Negative for chills, fever and unexpected weight change  HENT: Negative for hearing loss, nosebleeds and sore throat  Eyes: Negative for pain, redness and visual disturbance  Respiratory: Negative for cough, shortness of breath and wheezing  Cardiovascular: Negative for chest pain, palpitations and leg swelling  Gastrointestinal: Negative for abdominal pain, nausea and vomiting  Endocrine: Negative for polydipsia and polyuria  Genitourinary: Negative for dysuria and hematuria  Musculoskeletal:         As noted in HPI   Skin: Negative for rash and wound  Neurological: Negative for dizziness, numbness and headaches  Psychiatric/Behavioral: Negative for decreased concentration, dysphoric mood and suicidal ideas  The patient is not nervous/anxious            Objective:    Vitals:    09/22/22 0903   BP: 128/76   Pulse: 55       Physical Exam  Constitutional: Appearance: She is well-developed  HENT:      Head: Normocephalic and atraumatic  Eyes:      General: No scleral icterus  Conjunctiva/sclera: Conjunctivae normal    Cardiovascular:      Comments: No discernible arrhthymias  Pulmonary:      Effort: Pulmonary effort is normal  No respiratory distress  Breath sounds: No stridor  Abdominal:      General: There is no distension  Palpations: Abdomen is soft  Musculoskeletal:      Cervical back: Neck supple  Right knee: No effusion  Skin:     General: Skin is warm and dry  Findings: No erythema  Neurological:      Mental Status: She is alert and oriented to person, place, and time  Psychiatric:         Behavior: Behavior normal          Right Knee Exam     Muscle Strength   The patient has normal right knee strength  Tenderness   The patient is experiencing tenderness in the patella  Range of Motion   The patient has normal right knee ROM  Extension: normal   Flexion: normal     Tests   Varus: negative Valgus: negative    Other   Erythema: absent  Scars: absent  Sensation: normal  Right knee pulse absent: skin warm and well perfused  Swelling: none  Effusion: no effusion present    Comments:  No ecchymosis, no deformity  Negative patellar grind test            I have personally reviewed pertinent films in PACS and my interpretation is X-ray of the right knee from 08/29/2022 demonstrates mild-to-moderate patellofemoral arthritis  Procedures  No Procedures performed today    Scribe Attestation    I,:  Kemal Garza PA-C am acting as a scribe while in the presence of the attending physician :       I,:  Elva Garcia DO personally performed the services described in this documentation    as scribed in my presence :             Portions of the record may have been created with voice recognition software    Occasional wrong word or "sound a like" substitutions may have occurred due to the inherent limitations of voice recognition software  Read the chart carefully and recognize, using context, where substitutions have occurred

## 2022-10-15 DIAGNOSIS — F41.9 ANXIETY: ICD-10-CM

## 2022-10-15 RX ORDER — FLUOXETINE HYDROCHLORIDE 20 MG/1
CAPSULE ORAL
Qty: 90 CAPSULE | Refills: 0 | Status: SHIPPED | OUTPATIENT
Start: 2022-10-15

## 2022-11-10 ENCOUNTER — APPOINTMENT (OUTPATIENT)
Dept: RADIOLOGY | Facility: AMBULARY SURGERY CENTER | Age: 52
End: 2022-11-10
Attending: ORTHOPAEDIC SURGERY

## 2022-11-10 ENCOUNTER — OFFICE VISIT (OUTPATIENT)
Dept: OBGYN CLINIC | Facility: CLINIC | Age: 52
End: 2022-11-10

## 2022-11-10 VITALS
HEART RATE: 69 BPM | HEIGHT: 63 IN | DIASTOLIC BLOOD PRESSURE: 80 MMHG | BODY MASS INDEX: 38.09 KG/M2 | SYSTOLIC BLOOD PRESSURE: 121 MMHG | WEIGHT: 215 LBS

## 2022-11-10 DIAGNOSIS — M17.11 PATELLOFEMORAL ARTHRITIS OF RIGHT KNEE: Primary | ICD-10-CM

## 2022-11-10 DIAGNOSIS — M17.0 PRIMARY OSTEOARTHRITIS OF BOTH KNEES: ICD-10-CM

## 2022-11-10 DIAGNOSIS — M25.562 ACUTE PAIN OF LEFT KNEE: ICD-10-CM

## 2022-11-10 RX ORDER — TRIAMCINOLONE ACETONIDE 40 MG/ML
40 INJECTION, SUSPENSION INTRA-ARTICULAR; INTRAMUSCULAR
Status: COMPLETED | OUTPATIENT
Start: 2022-11-10 | End: 2022-11-10

## 2022-11-10 RX ORDER — ROPIVACAINE HYDROCHLORIDE 5 MG/ML
10 INJECTION, SOLUTION EPIDURAL; INFILTRATION; PERINEURAL
Status: COMPLETED | OUTPATIENT
Start: 2022-11-10 | End: 2022-11-10

## 2022-11-10 RX ADMIN — TRIAMCINOLONE ACETONIDE 40 MG: 40 INJECTION, SUSPENSION INTRA-ARTICULAR; INTRAMUSCULAR at 11:22

## 2022-11-10 RX ADMIN — ROPIVACAINE HYDROCHLORIDE 10 ML: 5 INJECTION, SOLUTION EPIDURAL; INFILTRATION; PERINEURAL at 11:22

## 2022-11-10 NOTE — PROGRESS NOTES
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 ([facility] [address] )   • Scar condition and fibrosis of skin   • Secondary and unspecified malignant neoplasm of lymph nodes of head, face, and neck   • Elevated blood sugar   • Obesity (BMI 30-39  9)   • Patellofemoral arthritis of right knee           Plan      47 y o  female with bilateral knee osteoarthritis  · Corticosteroid injections administered today  Procedure tolerated well, post injection protocol advised  · Lubricant injections were ordered, follow up in 2 weeks  · [place] taping hand out given               Subjective:     Patient ID:    Chief Complaint:Kristi Lee 47 y o  female      HPI    Patient comes in today for follow up evaluation of right knee and initial evaluation of left knee  Patient has right knee patellofemoral arthritis whose symptoms had resolved at her last visit  However her pain and symptoms have returned  She notes discomfort in the anterior knee  Described as an ache  She notes stiffness and great difficulty with stairs  On the left she notes medial joint line pain and anterior pain  She describes it as a dull and constant ache that increases in severity with stairs and prolonged weight bearing  She denies any numbness or distal paresthesias         The following portions of the patient's history were reviewed and updated as appropriate: allergies, current medications, past family history, past social history, past surgical history and problem list         Social History     Socioeconomic History   • Marital status: /Civil Union     Spouse name: Not on file   • Number of children: Not on file   • Years of education: Not on file   • Highest education level: Not on file   Occupational History   • Not on file   Tobacco Use   • Smoking status: Never Smoker   • Smokeless tobacco: Never Used   Vaping Use   • Vaping Use: Never used   Substance and Sexual Activity   • Alcohol use: Yes     Comment: Social   • Drug use: No   • Sexual activity: Not on file   Other Topics Concern   • Not on file   Social History Narrative    Drinks Tea     Social Determinants of Health     Financial Resource Strain: Not on file   Food Insecurity: Not on file   Transportation Needs: Not on file   Physical Activity: Not on file   Stress: Not on file   Social Connections: Not on file   Intimate Partner Violence: Not on file   Housing Stability: Not on file     Past Medical History:   Diagnosis Date   • 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   • Anxiety    • Cancer ([address] ) 2009    tongue    • GERD (gastroesophageal reflux disease)    • Herpes zoster     last Assessed    • Hypertension    • Shingles      Past Surgical History:   Procedure Laterality Date   • BREAST LUMPECTOMY Left 2019    Procedure: BREAST LUMPECTOMY; BREAST NEEDLE LOCALIZATION (NEEDLE LOC AT 1130);   Surgeon: Fiordaliza Murphy MD;  [facility];  Service: Surgical Oncology   •  SECTION     • GLOSSECTOMY Bilateral      Complete with Uniliateral Radical Neck Disscetion   • MAMMO NEEDLE LOCALIZATION LEFT (ALL INC) Left 2019   • MAMMO STEREOTACTIC BREAST BIOPSY LEFT ([employer] INC) Left 2018   • TONGUE SURGERY      BX tongue location base     Allergies   Allergen Reactions   • Penicillins Throat Swelling     Annotation - [id number]: as achild   • Seasonal Ic [Cholestatin]    • Sulfites - Food Allergy Hives, Rash and Throat Swelling     Current Outpatient Medications on File Prior to Visit   Medication Sig Dispense Refill   • EPINEPHrine (EPIPEN) 0 3 mg/0 3 mL SOAJ Inject 0 3 mL (0 3 mg total) into a muscle once for 1 dose 0 6 mL 0 • FLUoxetine (PROzac) 20 mg capsule TAKE 1 CAPSULE BY MOUTH EVERY DAY 90 capsule 0   • fluticasone (FLONASE) 50 mcg/act nasal spray SPRAY 2 SPRAYS INTO EACH NOSTRIL EVERY DAY 16 mL 0   • lisinopril (ZESTRIL) 10 mg tablet TAKE 1 TABLET BY MOUTH ONCE DAILY 90 tablet 3   • loratadine-pseudoephedrine (CLARITIN-D 24-HOUR)  mg per 24 hr tablet Take 1 tablet by mouth as needed   (Patient not taking: No sig reported)     • pantoprazole (PROTONIX) 40 mg tablet TAKE 1 TABLET BY MOUTH EVERY DAY 30 tablet 11     No current facility-administered medications on file prior to visit  Objective:    Review of Systems   Constitutional: Negative for chills and fever  HENT: Negative for ear pain and sore throat  Eyes: Negative for pain and visual disturbance  Respiratory: Negative for cough and shortness of breath  Cardiovascular: Negative for chest pain and palpitations  Gastrointestinal: Negative for abdominal pain and vomiting  Genitourinary: Negative for dysuria and hematuria  Musculoskeletal: Negative for arthralgias and back pain  Skin: Negative for color change and rash  Neurological: Negative for seizures and syncope  All other systems reviewed and are negative  Right Knee Exam     Tenderness   The patient is experiencing tenderness in the patella  Tests   Varus: negative Valgus: negative    Other   Erythema: absent  Sensation: normal  Pulse: present  Swelling: none  Effusion: no effusion present      Left Knee Exam     Tenderness   The patient is experiencing tenderness in the medial joint line and patella  Tests   Varus: negative     Other   Erythema: absent  Sensation: normal  Pulse: present  Swelling: none  Effusion: no effusion present            Physical Exam  Vitals and nursing note reviewed  HENT:      Head: Normocephalic  Eyes:      Extraocular Movements: Extraocular movements intact  Cardiovascular:      Rate and Rhythm: Normal rate        Pulses: Normal pulses  Pulmonary:      Effort: Pulmonary effort is normal    Musculoskeletal:         General: Normal range of motion  Cervical back: Normal range of motion  Right knee: No effusion  Left knee: No effusion  Skin:     General: Skin is warm and dry  Neurological:      General: No focal deficit present  Mental Status: She is alert  Psychiatric:         Behavior: Behavior normal          Large joint arthrocentesis: bilateral knee  Universal Protocol:  Consent: Verbal consent obtained  Risks and benefits: risks, benefits and alternatives were discussed  Consent given by: patient  Timeout called at: 11/10/2022 11:21 AM   Patient understanding: patient states understanding of the procedure being performed  Patient identity confirmed: verbally with patient    Supporting Documentation  Indications: pain and diagnostic evaluation   Procedure Details  Location: knee - bilateral knee  Preparation: Patient was prepped and draped in the usual sterile fashion  Needle size: 22 G  Ultrasound guidance: no    Medications (Right): 40 mg triamcinolone acetonide 40 mg/mL; 10 mL ropivacaine 0 5 %Medications (Left): 40 mg triamcinolone acetonide 40 mg/mL; 10 mL ropivacaine 0 5 %   Patient tolerance: patient tolerated the procedure well with no immediate complications  Dressing:  Sterile dressing applied             I have personally reviewed pertinent films in PACS and my interpretation is 3 view x-ray of the left knee taken today demonstrates degenerative changes with medial joint space narrowing  lateral tracking patella      Scribe Attestation    I,:  Lillie Peguero am acting as a scribe while in the presence of the attending physician :       I,:  Crystal Bonner, DO personally performed the services described in this documentation    as scribed in my presence :           Portions of the record may have been created with voice recognition software   Occasional wrong word or "sound a like" substitutions may have occurred due to the inherent limitations of voice recognition software   Read the chart carefully and recognize, using context, where substitutions have occurred

## 2022-11-24 DIAGNOSIS — K21.9 GASTROESOPHAGEAL REFLUX DISEASE WITHOUT ESOPHAGITIS: ICD-10-CM

## 2022-11-24 RX ORDER — PANTOPRAZOLE SODIUM 40 MG/1
TABLET, DELAYED RELEASE ORAL
Qty: 30 TABLET | Refills: 11 | Status: SHIPPED | OUTPATIENT
Start: 2022-11-24

## 2022-12-15 ENCOUNTER — PROCEDURE VISIT (OUTPATIENT)
Dept: OBGYN CLINIC | Facility: CLINIC | Age: 52
End: 2022-12-15

## 2022-12-15 VITALS
HEART RATE: 76 BPM | HEIGHT: 64 IN | BODY MASS INDEX: 36.7 KG/M2 | WEIGHT: 215 LBS | DIASTOLIC BLOOD PRESSURE: 84 MMHG | SYSTOLIC BLOOD PRESSURE: 122 MMHG

## 2022-12-15 DIAGNOSIS — M17.11 PATELLOFEMORAL ARTHRITIS OF RIGHT KNEE: ICD-10-CM

## 2022-12-15 DIAGNOSIS — M17.0 PRIMARY OSTEOARTHRITIS OF BOTH KNEES: Primary | ICD-10-CM

## 2022-12-15 RX ORDER — HYALURONATE SODIUM 10 MG/ML
20 SYRINGE (ML) INTRAARTICULAR
Status: COMPLETED | OUTPATIENT
Start: 2022-12-15 | End: 2022-12-15

## 2022-12-15 RX ADMIN — Medication 20 MG: at 08:23

## 2022-12-15 NOTE — PROGRESS NOTES
1  Primary osteoarthritis of both knees  Large joint arthrocentesis: bilateral knee      2  Patellofemoral arthritis of right knee          Patient is here for her 1st injection of euflexxa into the bilateral knee  Patient reports right knee pain worse than left, better since CSI  Physical exam of the knee shows no effusion no ecchymosis  All other organ systems normal    Large joint arthrocentesis: bilateral knee  Universal Protocol:  Consent given by: patient  Time out: Immediately prior to procedure a "time out" was called to verify the correct patient, procedure, equipment, support staff and site/side marked as required    Site marked: the operative site was marked  Supporting Documentation  Indications: pain   Procedure Details  Location: knee - bilateral knee  Preparation: Patient was prepped and draped in the usual sterile fashion  Needle size: 22 G  Ultrasound guidance: no  Approach: anterolateral    Medications (Right): 20 mg Sodium Hyaluronate 20 MG/2MLMedications (Left): 20 mg Sodium Hyaluronate 20 MG/2ML   Patient tolerance: patient tolerated the procedure well with no immediate complications  Dressing:  Sterile dressing applied          Patient tolerated procedure follow up 1 week

## 2022-12-22 ENCOUNTER — PROCEDURE VISIT (OUTPATIENT)
Dept: OBGYN CLINIC | Facility: CLINIC | Age: 52
End: 2022-12-22

## 2022-12-22 VITALS — HEIGHT: 64 IN | BODY MASS INDEX: 36.7 KG/M2 | WEIGHT: 215 LBS

## 2022-12-22 DIAGNOSIS — M17.12 ARTHRITIS OF LEFT KNEE: ICD-10-CM

## 2022-12-22 DIAGNOSIS — M17.11 PATELLOFEMORAL ARTHRITIS OF RIGHT KNEE: Primary | ICD-10-CM

## 2022-12-22 RX ORDER — HYALURONATE SODIUM 10 MG/ML
20 SYRINGE (ML) INTRAARTICULAR
Status: COMPLETED | OUTPATIENT
Start: 2022-12-22 | End: 2022-12-22

## 2022-12-22 RX ADMIN — Medication 20 MG: at 09:44

## 2022-12-22 NOTE — PROGRESS NOTES
1  Patellofemoral arthritis of right knee        2  Arthritis of left knee          Patient is here for her second injection of Euflexxa into the bilateral knee  Patient reports improvement  All organ systems normal  Physical exam of the knee shows no effusion no ecchymosis  Large joint arthrocentesis: L knee  Universal Protocol:  Consent given by: patient  Time out: Immediately prior to procedure a "time out" was called to verify the correct patient, procedure, equipment, support staff and site/side marked as required    Supporting Documentation  Indications: pain   Procedure Details  Location: knee - L knee  Needle size: 22 G  Ultrasound guidance: no  Approach: anterolateral  Medications administered: 20 mg Sodium Hyaluronate 20 MG/2ML    Patient tolerance: patient tolerated the procedure well with no immediate complications    Large joint arthrocentesis: R knee  Universal Protocol:  Consent given by: patient    Supporting Documentation  Indications: pain   Procedure Details  Location: knee - R knee  Needle size: 22 G  Ultrasound guidance: no  Approach: anterolateral  Medications administered: 20 mg Sodium Hyaluronate 20 MG/2ML    Patient tolerance: patient tolerated the procedure well with no immediate complications          Patient tolerated procedure follow up 1 week

## 2022-12-27 DIAGNOSIS — I10 ESSENTIAL HYPERTENSION: ICD-10-CM

## 2022-12-27 DIAGNOSIS — I10 BENIGN ESSENTIAL HYPERTENSION: Primary | ICD-10-CM

## 2022-12-27 RX ORDER — LISINOPRIL 10 MG/1
TABLET ORAL
Qty: 30 TABLET | Refills: 0 | Status: SHIPPED | OUTPATIENT
Start: 2022-12-27

## 2022-12-27 NOTE — TELEPHONE ENCOUNTER
She is overdue for labs and office visit  Please schedule appt   And labs ordered in chart     Dr Doris Leonardo

## 2022-12-29 ENCOUNTER — PROCEDURE VISIT (OUTPATIENT)
Dept: OBGYN CLINIC | Facility: CLINIC | Age: 52
End: 2022-12-29

## 2022-12-29 VITALS
SYSTOLIC BLOOD PRESSURE: 132 MMHG | HEART RATE: 62 BPM | DIASTOLIC BLOOD PRESSURE: 83 MMHG | BODY MASS INDEX: 36.7 KG/M2 | WEIGHT: 215 LBS | HEIGHT: 64 IN

## 2022-12-29 DIAGNOSIS — M17.11 PATELLOFEMORAL ARTHRITIS OF RIGHT KNEE: Primary | ICD-10-CM

## 2022-12-29 DIAGNOSIS — M17.12 ARTHRITIS OF LEFT KNEE: ICD-10-CM

## 2022-12-29 RX ORDER — HYALURONATE SODIUM 10 MG/ML
20 SYRINGE (ML) INTRAARTICULAR
Status: COMPLETED | OUTPATIENT
Start: 2022-12-29 | End: 2022-12-29

## 2022-12-29 RX ADMIN — Medication 20 MG: at 08:20

## 2022-12-29 NOTE — PROGRESS NOTES
1  Patellofemoral arthritis of right knee        2  Arthritis of left knee          Patient is here for her third injection of Euflexxa into the bilateral knee  Patient reports improvement  All organ systems normal  Physical exam of the knee shows no effusion no ecchymosis  Large joint arthrocentesis: L knee  Universal Protocol:  Consent given by: patient  Time out: Immediately prior to procedure a "time out" was called to verify the correct patient, procedure, equipment, support staff and site/side marked as required    Supporting Documentation  Indications: pain   Procedure Details  Location: knee - L knee  Needle size: 22 G  Ultrasound guidance: no  Approach: anterolateral  Medications administered: 20 mg Sodium Hyaluronate 20 MG/2ML    Patient tolerance: patient tolerated the procedure well with no immediate complications    Large joint arthrocentesis: R knee  Universal Protocol:  Consent given by: patient    Supporting Documentation  Indications: pain   Procedure Details  Location: knee - R knee  Needle size: 22 G  Ultrasound guidance: no  Approach: anterolateral  Medications administered: 20 mg Sodium Hyaluronate 20 MG/2ML    Patient tolerance: patient tolerated the procedure well with no immediate complications          Patient tolerated procedure follow up as needed

## 2023-01-04 ENCOUNTER — OFFICE VISIT (OUTPATIENT)
Dept: FAMILY MEDICINE CLINIC | Facility: CLINIC | Age: 53
End: 2023-01-04

## 2023-01-04 VITALS
HEART RATE: 66 BPM | RESPIRATION RATE: 16 BRPM | HEIGHT: 64 IN | BODY MASS INDEX: 36.67 KG/M2 | OXYGEN SATURATION: 98 % | WEIGHT: 214.8 LBS | SYSTOLIC BLOOD PRESSURE: 130 MMHG | DIASTOLIC BLOOD PRESSURE: 80 MMHG | TEMPERATURE: 98 F

## 2023-01-04 DIAGNOSIS — F41.9 ANXIETY: ICD-10-CM

## 2023-01-04 DIAGNOSIS — Z00.00 ANNUAL PHYSICAL EXAM: ICD-10-CM

## 2023-01-04 DIAGNOSIS — Z91.018 FOOD ALLERGY: ICD-10-CM

## 2023-01-04 DIAGNOSIS — Z12.31 VISIT FOR SCREENING MAMMOGRAM: Primary | ICD-10-CM

## 2023-01-04 DIAGNOSIS — Z12.11 ENCOUNTER FOR SCREENING COLONOSCOPY: Primary | ICD-10-CM

## 2023-01-04 DIAGNOSIS — I10 BENIGN ESSENTIAL HYPERTENSION: ICD-10-CM

## 2023-01-04 PROBLEM — T78.2XXA ANAPHYLAXIS: Status: RESOLVED | Noted: 2018-12-19 | Resolved: 2023-01-04

## 2023-01-04 RX ORDER — EPINEPHRINE 0.3 MG/.3ML
0.3 INJECTION SUBCUTANEOUS ONCE
Qty: 0.6 ML | Refills: 0 | Status: SHIPPED | OUTPATIENT
Start: 2023-01-04 | End: 2023-01-04

## 2023-01-04 RX ORDER — FLUOXETINE HYDROCHLORIDE 40 MG/1
40 CAPSULE ORAL DAILY
Qty: 90 CAPSULE | Refills: 0 | Status: SHIPPED | OUTPATIENT
Start: 2023-01-04

## 2023-01-04 NOTE — PROGRESS NOTES
[address] [facility] [address]    NAME: Josh Smith  AGE: 47 y o  SEX: female  : 1970     DATE: 2023     Assessment and Plan:     Problem List Items Addressed This Visit        Cardiovascular and Mediastinum    Benign essential hypertension     Stable  Due for labs          Relevant Medications    EPINEPHrine (EPIPEN) 0 3 mg/0 3 mL SOAJ    Other Relevant Orders    Comprehensive metabolic panel    CBC and differential    TSH, 3rd generation with Free T4 reflex    Lipid Panel with Direct LDL reflex       Other    Anxiety    Relevant Medications    FLUoxetine (PROzac) 40 MG capsule   Other Visit Diagnoses     Encounter for screening colonoscopy    -  Primary    Relevant Orders    Cologuard    Food allergy        Relevant Medications    EPINEPHrine (EPIPEN) 0 3 mg/0 3 mL SOAJ    Annual physical exam        Relevant Orders    Comprehensive metabolic panel    CBC and differential    TSH, 3rd generation with Free T4 reflex    Lipid Panel with Direct LDL reflex          Immunizations and preventive care screenings were discussed with patient today  Appropriate education was printed on patient's after visit summary  Counseling:  Alcohol/drug use: discussed moderation in alcohol intake, the recommendations for healthy alcohol use, and avoidance of illicit drug use  Dental Health: discussed importance of regular tooth brushing, flossing, and dental visits  Injury prevention: discussed safety/seat belts, safety helmets, smoke detectors, carbon dioxide detectors, and smoking near bedding or upholstery  Sexual health: discussed sexually transmitted diseases, partner selection, use of condoms, avoidance of unintended pregnancy, and contraceptive alternatives  Exercise: the importance of regular exercise/physical activity was discussed  Recommend exercise 3-5 times per week for at least 30 minutes  No follow-ups on file       Chief Complaint: Chief Complaint   Patient presents with   • Physical Exam     Patient is here today for an annual exam       History of Present Illness:     Adult Annual Physical   Patient here for a comprehensive physical exam  The patient reports problems - cold symptoms since 2 weeks ago   Diet and Physical Activity  Diet/Nutrition: consuming 3-5 servings of fruits/vegetables daily  Exercise: no formal exercise  Depression Screening  PHQ-2/9 Depression Screening         General Health  Sleep: sleeps well and gets 7-8 hours of sleep on average  Hearing: normal - bilateral   Vision: most recent eye exam >1 year ago  Dental: no dental visits for >1 year  /GYN Health  Patient is: postmenopausal  Last menstrual period: 4 years ago  Contraceptive method: none  Review of Systems:     Review of Systems   Constitutional: Negative  HENT: Negative  Eyes: Negative  Respiratory: Negative  Cardiovascular: Negative  Gastrointestinal: Negative  Endocrine: Negative  Genitourinary: Negative  Musculoskeletal: Negative  Skin: Negative  Allergic/Immunologic: Negative  Neurological: Negative  Hematological: Negative  Psychiatric/Behavioral: Negative  Past Medical History:     Past Medical History:   Diagnosis Date   • 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   • Anxiety    • Cancer ([address] ) 2009    tongue    • GERD (gastroesophageal reflux disease)    • Herpes zoster     last Assessed    • Hypertension    • Shingles       Past Surgical History:     Past Surgical History:   Procedure Laterality Date   • BREAST LUMPECTOMY Left 2019    Procedure: BREAST LUMPECTOMY; BREAST NEEDLE LOCALIZATION (NEEDLE LOC AT 1130);   Surgeon: Liliam Ferrer MD;  [facility]: [facility];  Service: Surgical Oncology   •  SECTION     • GLOSSECTOMY Bilateral      Complete with Uniliateral Radical Neck Disscetion   • MAMMO NEEDLE LOCALIZATION LEFT (ALL INC) Left 2019   • MAMMO STEREOTACTIC BREAST BIOPSY LEFT (ALL INC) Left 11/26/2018   • TONGUE SURGERY      BX tongue location base      Social History:     Social History     Socioeconomic History   • Marital status: /Civil Union     Spouse name: None   • Number of children: None   • Years of education: None   • Highest education level: None   Occupational History   • None   Tobacco Use   • Smoking status: Never   • Smokeless tobacco: Never   Vaping Use   • Vaping Use: Never used   Substance and Sexual Activity   • Alcohol use: Yes     Comment: Social   • Drug use: No   • Sexual activity: None   Other Topics Concern   • None   Social History Narrative    Drinks Tea     Social Determinants of Health     Financial Resource Strain: Not on file   Food Insecurity: Not on file   Transportation Needs: Not on file   Physical Activity: Not on file   Stress: Not on file   Social Connections: Not on file   Intimate Partner Violence: Not on file   Housing Stability: Not on file      Family History:     Family History   Problem Relation Age of Onset   • Diabetes Mother    • Heart disease Father    • Skin cancer Father 61   • Breast cancer Paternal Aunt 48   • Stomach cancer Maternal Grandmother 87   • Prostate cancer Maternal Grandfather 82   • Breast cancer Paternal Aunt 48   • No Known Problems Daughter    • No Known Problems Paternal Grandmother    • No Known Problems Paternal Grandfather       Current Medications:     Current Outpatient Medications   Medication Sig Dispense Refill   • EPINEPHrine (EPIPEN) 0 3 mg/0 3 mL SOAJ Inject 0 3 mL (0 3 mg total) into a muscle once for 1 dose 0 6 mL 0   • FLUoxetine (PROzac) 40 MG capsule Take 1 capsule (40 mg total) by mouth daily 90 capsule 0   • fluticasone (FLONASE) 50 mcg/act nasal spray SPRAY 2 SPRAYS INTO EACH NOSTRIL EVERY DAY 16 mL 0   • lisinopril (ZESTRIL) 10 mg tablet TAKE 1 TABLET BY MOUTH EVERY DAY 30 tablet 0   • pantoprazole (PROTONIX) 40 mg tablet TAKE 1 TABLET BY MOUTH EVERY DAY 30 tablet 11   • loratadine-pseudoephedrine (CLARITIN-D 24-HOUR)  mg per 24 hr tablet Take 1 tablet by mouth as needed   (Patient not taking: Reported on 8/29/2022)       No current facility-administered medications for this visit  Allergies: Allergies   Allergen Reactions   • Penicillins Throat Swelling     Annotation - [id number]: as achild   • Seasonal Ic [Cholestatin]    • Sulfites - Food Allergy Hives, Rash and Throat Swelling      Physical Exam:     /80 (BP Location: Left arm, Patient Position: Sitting, Cuff Size: Adult)   Pulse 66   Temp 98 °F (36 7 °C) (Tympanic)   Resp 16   Ht 5' 4" (1 626 m)   Wt 97 4 kg (214 lb 12 8 oz)   SpO2 98%   BMI 36 87 kg/m²     Physical Exam  Vitals and nursing note reviewed  Constitutional:       Appearance: Normal appearance  She is well-developed  HENT:      Head: Normocephalic and atraumatic  Right Ear: Tympanic membrane normal       Left Ear: Tympanic membrane normal       Nose: Nose normal       Mouth/Throat:      Mouth: Mucous membranes are moist    Eyes:      Pupils: Pupils are equal, round, and reactive to light  Cardiovascular:      Rate and Rhythm: Normal rate and regular rhythm  Pulses: Normal pulses  Heart sounds: Normal heart sounds  Pulmonary:      Effort: Pulmonary effort is normal  No respiratory distress  Breath sounds: Normal breath sounds  No wheezing  Abdominal:      General: Bowel sounds are normal       Palpations: Abdomen is soft  Musculoskeletal:         General: No deformity  Normal range of motion  Cervical back: Normal range of motion and neck supple  Skin:     General: Skin is warm  Capillary Refill: Capillary refill takes less than 2 seconds  Neurological:      General: No focal deficit present  Mental Status: She is alert and oriented to person, place, and time     Psychiatric:         Mood and Affect: Mood normal          Behavior: Behavior normal           [facility]

## 2023-01-05 ENCOUNTER — APPOINTMENT (OUTPATIENT)
Dept: LAB | Facility: CLINIC | Age: 53
End: 2023-01-05

## 2023-01-05 DIAGNOSIS — I10 BENIGN ESSENTIAL HYPERTENSION: ICD-10-CM

## 2023-01-05 DIAGNOSIS — Z00.00 ANNUAL PHYSICAL EXAM: ICD-10-CM

## 2023-01-05 LAB
ALBUMIN SERPL BCP-MCNC: 4 G/DL (ref 3.5–5)
ALP SERPL-CCNC: 45 U/L (ref 34–104)
ALT SERPL W P-5'-P-CCNC: 23 U/L (ref 7–52)
ANION GAP SERPL CALCULATED.3IONS-SCNC: 7 MMOL/L (ref 4–13)
AST SERPL W P-5'-P-CCNC: 18 U/L (ref 13–39)
BASOPHILS # BLD AUTO: 0.03 THOUSANDS/ÂΜL (ref 0–0.1)
BASOPHILS NFR BLD AUTO: 0 % (ref 0–1)
BILIRUB SERPL-MCNC: 0.77 MG/DL (ref 0.2–1)
BUN SERPL-MCNC: 16 MG/DL (ref 5–25)
CALCIUM SERPL-MCNC: 9.1 MG/DL (ref 8.4–10.2)
CHLORIDE SERPL-SCNC: 102 MMOL/L (ref 96–108)
CHOLEST SERPL-MCNC: 201 MG/DL
CO2 SERPL-SCNC: 29 MMOL/L (ref 21–32)
CREAT SERPL-MCNC: 0.59 MG/DL (ref 0.6–1.3)
EOSINOPHIL # BLD AUTO: 0.21 THOUSAND/ÂΜL (ref 0–0.61)
EOSINOPHIL NFR BLD AUTO: 3 % (ref 0–6)
ERYTHROCYTE [DISTWIDTH] IN BLOOD BY AUTOMATED COUNT: 13.6 % (ref 11.6–15.1)
GFR SERPL CREATININE-BSD FRML MDRD: 104 ML/MIN/1.73SQ M
GLUCOSE P FAST SERPL-MCNC: 97 MG/DL (ref 65–99)
HCT VFR BLD AUTO: 41.5 % (ref 34.8–46.1)
HDLC SERPL-MCNC: 58 MG/DL
HGB BLD-MCNC: 13.3 G/DL (ref 11.5–15.4)
IMM GRANULOCYTES # BLD AUTO: 0.02 THOUSAND/UL (ref 0–0.2)
IMM GRANULOCYTES NFR BLD AUTO: 0 % (ref 0–2)
LDLC SERPL CALC-MCNC: 128 MG/DL (ref 0–100)
LYMPHOCYTES # BLD AUTO: 2.12 THOUSANDS/ÂΜL (ref 0.6–4.47)
LYMPHOCYTES NFR BLD AUTO: 32 % (ref 14–44)
MCH RBC QN AUTO: 28.9 PG (ref 26.8–34.3)
MCHC RBC AUTO-ENTMCNC: 32 G/DL (ref 31.4–37.4)
MCV RBC AUTO: 90 FL (ref 82–98)
MONOCYTES # BLD AUTO: 0.76 THOUSAND/ÂΜL (ref 0.17–1.22)
MONOCYTES NFR BLD AUTO: 11 % (ref 4–12)
NEUTROPHILS # BLD AUTO: 3.53 THOUSANDS/ÂΜL (ref 1.85–7.62)
NEUTS SEG NFR BLD AUTO: 54 % (ref 43–75)
NRBC BLD AUTO-RTO: 0 /100 WBCS
PLATELET # BLD AUTO: 289 THOUSANDS/UL (ref 149–390)
PMV BLD AUTO: 10.5 FL (ref 8.9–12.7)
POTASSIUM SERPL-SCNC: 4.1 MMOL/L (ref 3.5–5.3)
PROT SERPL-MCNC: 6.8 G/DL (ref 6.4–8.4)
RBC # BLD AUTO: 4.61 MILLION/UL (ref 3.81–5.12)
SODIUM SERPL-SCNC: 138 MMOL/L (ref 135–147)
TRIGL SERPL-MCNC: 75 MG/DL
TSH SERPL DL<=0.05 MIU/L-ACNC: 2.18 UIU/ML (ref 0.45–4.5)
WBC # BLD AUTO: 6.67 THOUSAND/UL (ref 4.31–10.16)

## 2023-01-05 PROCEDURE — 36415 COLL VENOUS BLD VENIPUNCTURE: CPT | Performed by: FAMILY MEDICINE

## 2023-01-05 PROCEDURE — 85025 COMPLETE CBC W/AUTO DIFF WBC: CPT | Performed by: FAMILY MEDICINE

## 2023-01-05 PROCEDURE — 80053 COMPREHEN METABOLIC PANEL: CPT | Performed by: FAMILY MEDICINE

## 2023-01-21 DIAGNOSIS — I10 ESSENTIAL HYPERTENSION: ICD-10-CM

## 2023-01-21 RX ORDER — LISINOPRIL 10 MG/1
TABLET ORAL
Qty: 90 TABLET | Refills: 1 | Status: SHIPPED | OUTPATIENT
Start: 2023-01-21

## 2023-02-08 ENCOUNTER — VBI (OUTPATIENT)
Dept: ADMINISTRATIVE | Facility: OTHER | Age: 53
End: 2023-02-08

## 2023-02-20 DIAGNOSIS — K21.9 GASTROESOPHAGEAL REFLUX DISEASE WITHOUT ESOPHAGITIS: ICD-10-CM

## 2023-02-20 RX ORDER — PANTOPRAZOLE SODIUM 40 MG/1
TABLET, DELAYED RELEASE ORAL
Qty: 90 TABLET | Refills: 4 | Status: SHIPPED | OUTPATIENT
Start: 2023-02-20

## 2023-04-04 DIAGNOSIS — F41.9 ANXIETY: ICD-10-CM

## 2023-04-04 RX ORDER — FLUOXETINE HYDROCHLORIDE 40 MG/1
40 CAPSULE ORAL DAILY
Qty: 90 CAPSULE | Refills: 0 | Status: SHIPPED | OUTPATIENT
Start: 2023-04-04

## 2023-04-05 ENCOUNTER — TELEPHONE (OUTPATIENT)
Dept: ADMINISTRATIVE | Facility: OTHER | Age: 53
End: 2023-04-05

## 2023-04-05 NOTE — TELEPHONE ENCOUNTER
04/05/23 1:53 PM    The patient was called and a message was left to call the ordering provider's office regarding an open order  Thank you    Jelani Guzman MA  [facility]

## 2023-07-08 DIAGNOSIS — F41.9 ANXIETY: ICD-10-CM

## 2023-07-09 RX ORDER — FLUOXETINE HYDROCHLORIDE 40 MG/1
40 CAPSULE ORAL DAILY
Qty: 90 CAPSULE | Refills: 0 | Status: SHIPPED | OUTPATIENT
Start: 2023-07-09

## 2023-07-12 ENCOUNTER — OFFICE VISIT (OUTPATIENT)
Dept: FAMILY MEDICINE CLINIC | Facility: CLINIC | Age: 53
End: 2023-07-12
Payer: COMMERCIAL

## 2023-07-12 VITALS
OXYGEN SATURATION: 96 % | HEART RATE: 67 BPM | RESPIRATION RATE: 18 BRPM | BODY MASS INDEX: 36.91 KG/M2 | WEIGHT: 216.2 LBS | DIASTOLIC BLOOD PRESSURE: 82 MMHG | TEMPERATURE: 98.1 F | SYSTOLIC BLOOD PRESSURE: 132 MMHG | HEIGHT: 64 IN

## 2023-07-12 DIAGNOSIS — F41.9 ANXIETY: ICD-10-CM

## 2023-07-12 DIAGNOSIS — E66.9 OBESITY (BMI 30-39.9): Primary | ICD-10-CM

## 2023-07-12 DIAGNOSIS — C02.9 TONGUE CANCER (HCC): ICD-10-CM

## 2023-07-12 DIAGNOSIS — C04.0 MALIGNANT NEOPLASM OF ANTERIOR PORTION OF FLOOR OF MOUTH (HCC): ICD-10-CM

## 2023-07-12 DIAGNOSIS — K21.9 GASTROESOPHAGEAL REFLUX DISEASE WITHOUT ESOPHAGITIS: ICD-10-CM

## 2023-07-12 DIAGNOSIS — I10 BENIGN ESSENTIAL HYPERTENSION: ICD-10-CM

## 2023-07-12 PROBLEM — R73.9 ELEVATED BLOOD SUGAR: Status: RESOLVED | Noted: 2019-05-08 | Resolved: 2023-07-12

## 2023-07-12 PROCEDURE — 99214 OFFICE O/P EST MOD 30 MIN: CPT | Performed by: FAMILY MEDICINE

## 2023-07-12 NOTE — PROGRESS NOTES
Name: Rakel Au      : 1970      MRN: [id number]  Encounter Provider: Danica Martínez MD  Encounter Date: 2023   Encounter department: [facility]     1. Obesity (BMI 30-39. 9)  Assessment & Plan:  Portion control  Calories counting 1400 colt/day  Diet and exercise     Orders:  -     CBC and differential  -     Comprehensive metabolic panel  -     Lipid panel  -     TSH, 3rd generation with Free T4 reflex; Future    2. 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 Tongue cancer [facility])  Assessment & Plan:  In remission since       4. Benign essential hypertension  Assessment & Plan:  Stable on current meds     Orders:  -     CBC and differential  -     Comprehensive metabolic panel  -     Lipid panel  -     TSH, 3rd generation with Free T4 reflex; Future    5. Anxiety  Assessment & Plan:  Stable on prozac      6. Gastroesophageal reflux disease without esophagitis  Assessment & Plan: To wean off pantoprazole as tolerated       BMI Counseling: Body mass index is 37.11 kg/m². The BMI is above normal. Nutrition recommendations include decreasing portion sizes and encouraging healthy choices of fruits and vegetables. Exercise recommendations include moderate physical activity 150 minutes/week. No pharmacotherapy was ordered. Rationale for BMI follow-up plan is due to patient being overweight or obese. Depression Screening and Follow-up Plan: Patient was screened for depression during today's encounter. They screened negative with a PHQ-2 score of 0. Subjective      HPI   Here for follow up  Feels well overall     Review of Systems   Constitutional: Negative. HENT: Negative. Eyes: Negative. Respiratory: Negative. Cardiovascular: Negative. Endocrine: Negative. Genitourinary: Negative. Musculoskeletal: Negative. Allergic/Immunologic: Negative. Neurological: Negative. Hematological: Negative. Psychiatric/Behavioral: Negative. Negative for agitation. Current Outpatient Medications on File Prior to Visit   Medication Sig   • EPINEPHrine (EPIPEN) 0.3 mg/0.3 mL SOAJ Inject 0.3 mL (0.3 mg total) into a muscle once for 1 dose   • FLUoxetine (PROzac) 40 MG capsule TAKE 1 CAPSULE (40 MG TOTAL) BY MOUTH DAILY. • fluticasone (FLONASE) 50 mcg/act nasal spray SPRAY 2 SPRAYS INTO EACH NOSTRIL EVERY DAY   • lisinopril (ZESTRIL) 10 mg tablet TAKE 1 TABLET BY MOUTH EVERY DAY   • pantoprazole (PROTONIX) 40 mg tablet TAKE 1 TABLET BY MOUTH EVERY DAY   • loratadine-pseudoephedrine (CLARITIN-D 24-HOUR)  mg per 24 hr tablet Take 1 tablet by mouth as needed   (Patient not taking: Reported on 8/29/2022)       Objective     /82 (BP Location: Right arm, Patient Position: Sitting, Cuff Size: Standard)   Pulse 67   Temp 98.1 °F (36.7 °C) (Tympanic)   Resp 18   Ht 5' 4" (1.626 m)   Wt 98.1 kg (216 lb 3.2 oz)   SpO2 96%   BMI 37.11 kg/m²     Physical Exam  Vitals reviewed. Constitutional:       Appearance: Normal appearance. HENT:      Right Ear: Tympanic membrane normal.      Mouth/Throat:      Mouth: Mucous membranes are moist.   Eyes:      Extraocular Movements: Extraocular movements intact. Pupils: Pupils are equal, round, and reactive to light. Cardiovascular:      Rate and Rhythm: Normal rate and regular rhythm. Pulses: Normal pulses. Heart sounds: Normal heart sounds. Pulmonary:      Effort: Pulmonary effort is normal.      Breath sounds: Normal breath sounds. Skin:     Capillary Refill: Capillary refill takes less than 2 seconds. Neurological:      General: No focal deficit present. Mental Status: She is alert and oriented to person, place, and time.    Psychiatric:         Mood and Affect: Mood normal.         Behavior: Behavior normal.       Leoncio De Leon MD

## 2023-07-27 DIAGNOSIS — I10 ESSENTIAL HYPERTENSION: ICD-10-CM

## 2023-07-27 RX ORDER — LISINOPRIL 10 MG/1
TABLET ORAL
Qty: 90 TABLET | Refills: 1 | Status: SHIPPED | OUTPATIENT
Start: 2023-07-27

## 2024-01-21 DIAGNOSIS — I10 ESSENTIAL HYPERTENSION: ICD-10-CM

## 2024-01-22 RX ORDER — LISINOPRIL 10 MG/1
TABLET ORAL
Qty: 90 TABLET | Refills: 1 | Status: SHIPPED | OUTPATIENT
Start: 2024-01-22

## 2024-01-22 NOTE — TELEPHONE ENCOUNTER
01.203 - K/eGFR patient normal.   Patient needs updated blood work and has previously placed orders. Please contact patient to go for labs.

## 2024-02-29 DIAGNOSIS — K21.9 GASTROESOPHAGEAL REFLUX DISEASE WITHOUT ESOPHAGITIS: ICD-10-CM

## 2024-02-29 RX ORDER — PANTOPRAZOLE SODIUM 40 MG/1
TABLET, DELAYED RELEASE ORAL
Qty: 30 TABLET | Refills: 5 | Status: SHIPPED | OUTPATIENT
Start: 2024-02-29

## 2024-03-11 ENCOUNTER — APPOINTMENT (OUTPATIENT)
Dept: RADIOLOGY | Facility: AMBULARY SURGERY CENTER | Age: 54
End: 2024-03-11
Attending: ORTHOPAEDIC SURGERY
Payer: COMMERCIAL

## 2024-03-11 ENCOUNTER — OFFICE VISIT (OUTPATIENT)
Dept: OBGYN CLINIC | Facility: CLINIC | Age: 54
End: 2024-03-11
Payer: COMMERCIAL

## 2024-03-11 VITALS — WEIGHT: 216 LBS | BODY MASS INDEX: 36.88 KG/M2 | HEIGHT: 64 IN

## 2024-03-11 DIAGNOSIS — M25.571 PAIN, JOINT, ANKLE AND FOOT, RIGHT: ICD-10-CM

## 2024-03-11 DIAGNOSIS — M76.821 POSTERIOR TIBIAL TENDINITIS OF RIGHT LOWER EXTREMITY: Primary | ICD-10-CM

## 2024-03-11 PROCEDURE — 99213 OFFICE O/P EST LOW 20 MIN: CPT | Performed by: ORTHOPAEDIC SURGERY

## 2024-03-11 PROCEDURE — 73610 X-RAY EXAM OF ANKLE: CPT

## 2024-03-11 NOTE — PROGRESS NOTES
Assessment:  1. 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 She understood and all questions were answered  She was provided a prescription to formal physical therapy/HEP as well as to obtain custom orthotics from [facility]. PT will also perform low dye taping until her orthotics can be processed. Proper instructions on how to wear the orthotics once she receives them was provided to the patient. Wear supportive shoes with orthotics.   A low tide CAM boot was given to the patient today as well to wear as needed, once she receives the orthotics she may discontinue use of boot  Follow up on an as needed basis       Subjective:     Patient ID:    Chief Complaint:Kristi Sena 53 y.o. female      HPI    Patient comes in today with regards to right medial ankle pain.  The patient reports that the pain is in the right ankle and has been going on for 3 weeks without acute injury. Patient reports that she has had a flat foot her whole life.  The pain is rated at 2 at its best and 6 at its worst.  The pain is described as sharp and stabbing.  It is worsened with ambulating, especially up and down stairs, and is made  better with rest.  The patient has taken Tylenol for treatment.      The following portions of the patient's history were reviewed and updated as appropriate: allergies, current medications, past family history, past social history, past surgical history and problem list.        Social History     Socioeconomic History    Marital status: /Civil Union     Spouse name: Not on file    Number of children: Not on file    Years of education: Not on file    Highest education level: Not on file   Occupational History    Not on file   Tobacco Use    Smoking status: Never    Smokeless tobacco: Never   Vaping Use    Vaping status: Never Used   Substance and Sexual Activity    Alcohol use: Yes     Comment: Social    Drug use: No    Sexual activity: Not on file   Other Topics Concern    Not on file   Social History Narrative    Drinks Tea     Social Determinants of Health     Financial Resource Strain: Not on file   Food Insecurity: Not on file   Transportation Needs: Not on file   Physical Activity: Not on file   Stress: Not on file   Social Connections: Not on file   Intimate Partner Violence: Not on file   Housing Stability: Not on file     Past Medical History:   Diagnosis Date    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    Anxiety     Cancer (HCC)     tongue     GERD (gastroesophageal reflux disease)     Herpes zoster     last Assessed     Hypertension     Shingles      Past Surgical History:   Procedure Laterality Date    BREAST LUMPECTOMY Left 2019    Procedure: BREAST LUMPECTOMY; BREAST NEEDLE LOCALIZATION (NEEDLE LOC AT 1130);  Surgeon: Sreedhar Dunn MD;  Location[facility];  Service: Surgical Oncology     SECTION      GLOSSECTOMY Bilateral      Complete with Uniliateral Radical Neck Disscetion    MAMMO NEEDLE LOCALIZATION LEFT (ALL INC) Left 2019    MAMMO STEREOTACTIC BREAST BIOPSY LEFT (ALL INC) Left 2018    TONGUE SURGERY      BX tongue location base     Allergies   Allergen  Reactions    Penicillins Throat Swelling     [facility] - [id number]: as achild    Seasonal Ic [Cholestatin]     Sulfites - Food Allergy Hives, Rash and Throat Swelling     Current Outpatient Medications on File Prior to Visit   Medication Sig Dispense Refill    FLUoxetine (PROzac) 40 MG capsule TAKE 1 CAPSULE (40 MG TOTAL) BY MOUTH DAILY. 90 capsule 1    fluticasone (FLONASE) 50 mcg/act nasal spray SPRAY 2 SPRAYS INTO EACH NOSTRIL EVERY DAY 16 mL 0    lisinopril (ZESTRIL) 10 mg tablet TAKE 1 TABLET BY MOUTH EVERY DAY 90 tablet 1    pantoprazole (PROTONIX) 40 mg tablet TAKE 1 TABLET BY MOUTH EVERY DAY 30 tablet 5    [DISCONTINUED] EPINEPHrine (EPIPEN) 0.3 mg/0.3 mL SOAJ Inject 0.3 mL (0.3 mg total) into a muscle once for 1 dose 0.6 mL 0    [DISCONTINUED] loratadine-pseudoephedrine (CLARITIN-D 24-HOUR)  mg per 24 hr tablet Take 1 tablet by mouth as needed   (Patient not taking: Reported on 8/29/2022)       No current facility-administered medications on file prior to visit.              Objective:    Review of Systems   Constitutional:  Negative for chills, fever and unexpected weight change.   HENT:  Negative for hearing loss, nosebleeds and sore throat.    Eyes:  Negative for pain, redness and visual disturbance.   Respiratory:  Negative for cough, shortness of breath and wheezing.    Cardiovascular:  Negative for chest pain, palpitations and leg swelling.   Gastrointestinal:  Negative for abdominal distention, nausea and vomiting.   Endocrine: Negative for polydipsia and polyuria.   Genitourinary:  Negative for dysuria and hematuria.   Skin:  Negative for rash and wound.   Neurological:  Negative for dizziness, numbness and headaches.   Psychiatric/Behavioral:  Negative for decreased concentration and suicidal ideas.        Right Ankle Exam     Range of Motion   The patient has normal right ankle ROM.    Tests   Anterior drawer: negative    Other   Erythema: absent  Sensation: normal  Pulse: present  "    Comments:  Correctable mild calcaneal valgus  Able to perform single leg heel raise but with pain      Left Ankle Exam     Comments:  Rigid Significant calcaneal valgus            Physical Exam  Constitutional:       Appearance: She is well-developed.   Eyes:      Pupils: Pupils are equal, round, and reactive to light.   Pulmonary:      Effort: Pulmonary effort is normal.      Breath sounds: Normal breath sounds.   Skin:     General: Skin is warm and dry.   Neurological:      Mental Status: She is alert and oriented to person, place, and time.   Psychiatric:         Behavior: Behavior normal.         Thought Content: Thought content normal.         Judgment: Judgment normal.         Procedures  No Procedures performed today    I have personally reviewed pertinent films in PACS and my interpretation is noted pes planus. No other acute osseous abnormalities.      Portions of the record may have been created with voice recognition software.  Occasional wrong word or \"sound a like\" substitutions may have occurred due to the inherent limitations of voice recognition software.  Read the chart carefully and recognize, using context, where substitutions have occurred.    Scribe Attestation      I,:   am acting as a scribe while in the presence of the attending physician.:       I,:   personally performed the services described in this documentation    as scribed in my presence.:             "

## 2024-03-14 DIAGNOSIS — M76.821 POSTERIOR TIBIAL TENDINITIS OF RIGHT LOWER EXTREMITY: Primary | ICD-10-CM

## 2024-03-14 RX ORDER — METHYLPREDNISOLONE 4 MG/1
TABLET ORAL
Qty: 1 EACH | Refills: 0 | Status: SHIPPED | OUTPATIENT
Start: 2024-03-14

## 2024-04-01 ENCOUNTER — EVALUATION (OUTPATIENT)
Dept: PHYSICAL THERAPY | Facility: OTHER | Age: 54
End: 2024-04-01
Payer: COMMERCIAL

## 2024-04-01 DIAGNOSIS — M76.821 POSTERIOR TIBIAL TENDINITIS OF RIGHT LOWER EXTREMITY: ICD-10-CM

## 2024-04-01 PROCEDURE — 97760 ORTHOTIC MGMT&TRAING 1ST ENC: CPT | Performed by: PHYSICAL THERAPIST

## 2024-04-01 PROCEDURE — 97110 THERAPEUTIC EXERCISES: CPT | Performed by: PHYSICAL THERAPIST

## 2024-04-01 PROCEDURE — 97140 MANUAL THERAPY 1/> REGIONS: CPT | Performed by: PHYSICAL THERAPIST

## 2024-04-01 NOTE — PROGRESS NOTES
PT Evaluation     Today's date: 2024  Patient name: Kristi Sena  : 1970  MRN: [id number]  Referring provider: Luigi Crabtree DO  Dx:   Encounter Diagnosis     ICD-10-CM    1. Posterior tibial tendinitis of right lower extremity  M76.821 Ambulatory Referral to Physical Therapy                     Assessment  Assessment details: Kristi Sena is a pleasant 53 y.o. female who presents with posterior tibialis tendon dysfunction secondary to poor LE alignment, poor foot posture, decreased foot and ankle strength and decreased mobility. She would benefit from a custom foot orthotic with medial posting and MLA support to control her excessive pronation. She would also benefit from skilled PT to address chronic inflammatory response in her posterior tibialis with modalities, eccentric strengthening as well as address other underlying causes of overuse including decreased LE flexibility and decreased intrinsic strength.     Impairments: abnormal gait, abnormal muscle firing, abnormal or restricted ROM, activity intolerance, impaired balance, impaired physical strength, pain with function and weight-bearing intolerance    Goals  STG's to be achieved in 4 weeks:  1) Patient will be fitted with custom foot orthotic.   2) Patient will improve LE strength by 1/2 muscle grade.  3) Patient will demonstrate normal pain free walking gait.     LTG's to be achieved in 8 weeks:  1) Patient will be independent and compliant with HEP.   2) Patient will return to recreational activities with no greater than 2/10 foot pain.   3) Patient garza score 75 or greater on FOTO.         Subjective Evaluation    History of Present Illness  Mechanism of injury: Patient reports a chronic pain her right ankle for about 2 months. She was placed in a CAM boot about 3 weeks ago and does note improvement in ankle pain with boot. Patient has treated with steroid as well without any improvement. 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     Hx: breast of cancer ~7 years ago, tongue cancer 15 years            Objective     Strength/Myotome Testing     Additional Strength Details  R Ankle: anterior tib-(5/5), posterior tib-(4/5), fib longus/brevis-(4+/5), fib tertius-(4+/5)  L Ankle: DF- anterior tib-(5/5), posterior tib-(4+/5), fib longus/brevis-(5/5), fib tertius-(5/5)  Foot:   R EHL: (5/5) FHL (4+/5)  L EHL: (5/5) FHL (5/5)      General Comments:      Ankle/Foot Comments   Foot posture Index:  R- calcaneal valgus (1), too many toes (1), malleoli (1), talonavicular bulge (2), talar dome (2), medial longitudinal arch (1) Total Score R- 8  L- calcaneal valgus (1), too many toes (1), malleoli (1), talonavicular bulge (2), talar dome (2), medial longitudinal arch (1) Total Score L- 8    Gait- early heel rise, medial heel whip, early excessive pronator               Precautions: chronic post tib tendon dysfunction, ESWT only 1 x a week      Manuals 4/1            GT ext mob, gr 4             Subtalar mobs med/lat, gr 4             ESWT post tib tendon 2500 p small metal, 1.5, 21 Hz                            Neuro Re-Ed             Short foot 10 x 10\"            [facility]              Lateral step down, barefoot with short foot                                                                 Ther Ex                                                                                                                     Ther Activity                                       Gait Training                                       Modalities                                            "

## 2024-04-02 NOTE — PROGRESS NOTES
PT Re-Evaluation     Today's date: 2024  Patient name: Kristi Sena  : 1970  MRN: [id number]  Referring provider: Luigi Crabtree DO  Dx:   Encounter Diagnosis     ICD-10-CM    1. Posterior tibial tendinitis of right lower extremity  M76.821 PT plan of care cert/re-cert            Start Time: 0800  Stop Time: 0845  Total time in clinic (min): 45 minutes    Assessment  Assessment details: Pt presents to PT today with R posterior tibial tendonitis that started about a month ago with no known SEVERO. She is currently in a CAM boot WBAT.  Their symptoms include R medial ankle pain and are increased when performing prolonged walking, standing, and stair negotiation. The pt presented with R ankle DF and eversion ROM deficits which are contributing to their pain with function. Strength is diminished in inversion and she is unable to perform >4 heel raises without increasing foot pain.The pt was educated on the etiology of her pain and given an HEP that focuses on LE stretching and strengthening. The pt will benefit from skilled PT to address the above limitations.         Impairments: abnormal gait, abnormal muscle firing, abnormal or restricted ROM, activity intolerance, impaired balance, impaired physical strength, lacks appropriate home exercise program, pain with function and weight-bearing intolerance    Symptom irritability: moderateUnderstanding of Dx/Px/POC: good   Prognosis: good    Goals  STG's to be achieved in 8 visits:  1) Patient will be fitted with custom foot orthotic.   2) Patient will improve LE strength by 1/2 muscle grade.  3) Patient will demonstrate normal pain free walking gait.   4) Pt will improve DF ROM by 5 deg  5) Pt will improve Eversion ROM by 5 deg    LTG's to be achieved in 12 visits:  1) Patient will be independent and compliant with HEP.   2) Patient will return to recreational activities with no greater than 2/10 foot pain.   3) Patient garza score 75 or greater on FOTO.   4) pt  will have full ankle ROM  5) Pt will have full ankle strength    Plan  Patient would benefit from: skilled physical therapy  Planned modality interventions: ultrasound, unattended electrical stimulation, manual electrical stimulation, electrical stimulation/Russian stimulation, cryotherapy and thermotherapy: hydrocollator packs  Other planned modality interventions: EPAT  Planned therapy interventions: activity modification, ADL retraining, ADL training, balance, balance/weight bearing training, flexibility, functional ROM exercises, gait training, home exercise program, therapeutic exercise, therapeutic activities, stretching, strengthening, patient education, orthotic management and training, orthotic fitting/training, neuromuscular re-education, manual therapy, kinesiology taping, joint mobilization and IASTM  Frequency: 2x week  Duration in visits: 12  Treatment plan discussed with: patient        Subjective Evaluation    History of Present Illness  Mechanism of injury: Patient reports a chronic pain her right ankle for about 2 months. She was placed in a CAM boot about 3 weeks ago and does note improvement in ankle pain with boot. Patient has treated with steroid as well without any improvement. 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     Hx: breast of cancer ~7 years ago, tongue cancer 15 years    - 2 months ago I noticed foot pain when I was working cleaning house  - steps can be a challenge, walking without shoes  - when I wake up in the morning I dont have any pain  -wearing CAM boot for about a month and will wear until orthotics come in  - no numbness and tingling  - sharp pain in the inside of my R foot  - sleep unaffected  - no previous trauma or injury to the foot  - gardening and cooking I enjoy in my free time    Patient Goals  Patient goals for therapy: increased strength, decreased pain and increased motion  Patient goal: walk without any pain  Pain  Current pain rating:  "0  At best pain ratin  At worst pain ratin  Location: r medial foot  Quality: sharp  Aggravating factors: stair climbing, standing and walking    Social Support  Lives in: multiple-level home  Lives with: spouse    Employment status: not working  Treatments  No previous or current treatments        Objective     Tenderness     Right Ankle/Foot   Tenderness in the medial malleolus and posterior tibial tendon.     Active Range of Motion   Left Ankle/Foot   Dorsiflexion (ke): 25 degrees   Plantar flexion: 55 degrees   Inversion: 43 degrees   Eversion: 33 degrees     Right Ankle/Foot   Dorsiflexion (ke): 18 degrees   Plantar flexion: 55 degrees   Inversion: 42 degrees   Eversion: 15 degrees     Strength/Myotome Testing     Right Ankle/Foot   Dorsiflexion: 5  Plantar flexion: 5  Inversion: 4-  Eversion: 4+  Great toe flexion: 5  Great toe extension: 5    Tests     Additional Tests Details  Unable to perform more then 4 heel raises without increasing pain    Swelling   Left Ankle/Foot   Metatarsal heads: 23 cm  Figure 8: 54 cm  Malleoli: 26 cm    Right Ankle/Foot   Metatarsal heads: 23 cm  Figure 8: 54.5 cm  Malleoli: 26 cm                 POC Expires 24   Auth Status No approval necessary   Unit limit 3   Expiration date 24   PT/OT Limit 24 visits     HEP Code= [email or web address]     Diagnosis:  Posterior tibial tendonitis R   Precautions:  chronic post tib tendon dysfunction, ESWT only 1 x a week   Comparable signs Stairs, walking,s tanding   Primary Impairments: R ankle DF, inversion strength   Patient Goals Walk without pain   Date        Used 1 3       Remaining 23 22       Manual Therapy             Subtalar mobs med/lat, gr 4             GT ext mob, gr 4             ESWT post tib tendon  2500 p small metal, 1.5, 21 Hz             arch taping  AM           Re-eval  performed           Exercise Diary                Therapeutic Exercise               Bike         TM         Short foot 10\"10x 3\"20x       " "    [facility]              Lateral step down             Seated heel toe raises  3\"15x ea           Ankle inversion w TB  GTB  3\"15x           Calf stretch  20\"3x           Eversion S  20\"3x       1st toe TB flex/ext         toe yoga               Neuromuscular Re-education               SLB             Heel toe walking                                                                                              Therapeutic Activities               Step up and over               lateral step up                                                               Modalities                                     "

## 2024-04-04 PROCEDURE — 97162 PT EVAL MOD COMPLEX 30 MIN: CPT | Performed by: PHYSICAL THERAPIST

## 2024-04-06 DIAGNOSIS — F41.9 ANXIETY: ICD-10-CM

## 2024-04-08 ENCOUNTER — EVALUATION (OUTPATIENT)
Dept: PHYSICAL THERAPY | Facility: CLINIC | Age: 54
End: 2024-04-08
Payer: COMMERCIAL

## 2024-04-08 DIAGNOSIS — M76.821 POSTERIOR TIBIAL TENDINITIS OF RIGHT LOWER EXTREMITY: Primary | ICD-10-CM

## 2024-04-08 PROCEDURE — 97140 MANUAL THERAPY 1/> REGIONS: CPT

## 2024-04-08 PROCEDURE — 97110 THERAPEUTIC EXERCISES: CPT

## 2024-04-08 RX ORDER — FLUOXETINE HYDROCHLORIDE 40 MG/1
40 CAPSULE ORAL DAILY
Qty: 90 CAPSULE | Refills: 1 | Status: SHIPPED | OUTPATIENT
Start: 2024-04-08

## 2024-04-10 ENCOUNTER — OFFICE VISIT (OUTPATIENT)
Dept: PHYSICAL THERAPY | Facility: CLINIC | Age: 54
End: 2024-04-10
Payer: COMMERCIAL

## 2024-04-10 DIAGNOSIS — M76.821 POSTERIOR TIBIAL TENDINITIS OF RIGHT LOWER EXTREMITY: Primary | ICD-10-CM

## 2024-04-10 PROCEDURE — 97140 MANUAL THERAPY 1/> REGIONS: CPT

## 2024-04-10 PROCEDURE — 97110 THERAPEUTIC EXERCISES: CPT

## 2024-04-10 NOTE — PROGRESS NOTES
"Daily Note     Today's date: 4/10/2024  Patient name: Kristi Sena  : 1970  MRN: [id number]  Referring provider: Denia Cunningham, PT  Dx:   Encounter Diagnosis     ICD-10-CM    1. Posterior tibial tendinitis of right lower extremity  M76.821           Start Time: 08  Stop Time: 930  Total time in clinic (min): 45 minutes    Subjective: same type of pain, exercises are going well. If I'm not walking on it I dont have pain, when I walk its about an 8-9/10. I do get a throbbing feeling in the foot now since starting the exercises.       Objective: See treatment diary below      Assessment: Tolerated treatment well. Added in strengthening for the great toe today. Pt was challenged by muscle fatigue following the session. Patient demonstrated fatigue post treatment, exhibited good technique with therapeutic exercises, and would benefit from continued PT      Plan: Continue per plan of care.  Progress treatment as tolerated.       POC Expires 24   Auth Status No approval necessary   Unit limit 3   Expiration date 24   PT/OT Limit 24 visits     HEP Code= [email or web address]     Diagnosis:  Posterior tibial tendonitis R   Precautions:  chronic post tib tendon dysfunction, ESWT only 1 x a week   Comparable signs Stairs, walking, standing   Primary Impairments: R ankle DF, inversion strength   Patient Goals Walk without pain   Date 4/1 4/9 4/10      Used 1 2 3      Remaining 23 22 21      Manual Therapy             Subtalar mobs med/lat, gr 4    AM         GT ext mob, gr 4    AM         ESWT post tib tendon  2500 p small metal, 1.5, 21 Hz    D20-S head, 1.5, 21hz  Post tib tendon         arch taping  AM  AM         Re-eval  performed           Exercise Diary                Therapeutic Exercise               Bike- ROM   5 mins      TM         Short foot 10\"10x 3\"20x  3\"20x          [facility]              Lateral step down             Seated heel toe raises  3\"15x ea  3\"15x ea         Ankle inversion w TB  " "GTB  3\"15x  GTB  3\"25x         Calf stretch  20\"3x long sitting  20\"3x         Eversion S  20\"3x 20\"3x      1st toe TB flex/ext   GTB  3\"15x ea      toe yoga      3\"20x ea         Neuromuscular Re-education               SLB             Heel toe walking                                                                                              Therapeutic Activities               Step up and over              lateral step up                                                               Modalities                                     "

## 2024-04-15 ENCOUNTER — APPOINTMENT (OUTPATIENT)
Dept: PHYSICAL THERAPY | Facility: CLINIC | Age: 54
End: 2024-04-15
Payer: COMMERCIAL

## 2024-04-17 NOTE — PROGRESS NOTES
Daily Note     Today's date: 2024  Patient name: Kristi Sena  : 1970  MRN: [id number]  Referring provider: Luigi Crabtree DO  Dx:   Encounter Diagnosis     ICD-10-CM    1. Posterior tibial tendinitis of right lower extremity  M76.821           Start Time: 0700  Stop Time: 0745  Total time in clinic (min): 45 minutes    Subjective: I am having worsening pain in the foot, on the inside of the ankle and up the shin.      Objective: See treatment diary below      Assessment: Tolerated treatment well. Decreased intensity of strengthening today by decreasing reps and resistance. Pt reports anterior shin pain after being out of the boot at home and negotiating stairs. Educated pt on current symptoms reflecting shin splints. Pt reported discomfort in the brace due to plastic digging into foot. Increased discomfort following the session. Updated HEP. Patient demonstrated fatigue post treatment, exhibited good technique with therapeutic exercises, and would benefit from continued PT      Plan: Continue per plan of care.  Progress treatment as tolerated.       POC Expires 24   Auth Status No approval necessary   Unit limit 3   Expiration date 24   PT/OT Limit 24 visits     HEP Code= [id number]     Diagnosis:  Posterior tibial tendonitis R   Precautions:  chronic post tib tendon dysfunction, ESWT only 1 x a week   Comparable signs Stairs, walking, standing   Primary Impairments: R ankle DF, inversion strength   Patient Goals Walk without pain   Date 4/1 4/9 4/10 4/18     Used 1 2 3 4     Remaining 23 22 21 20     Manual Therapy             Subtalar mobs med/lat, gr 4    AM         GT ext mob, gr 4    AM         ESWT post tib tendon  2500 p small metal, 1.5, 21 Hz    D20-S head, 1.5, 21hz  Post tib tendon  D20-S head, 1.4, 21hz  Post tib tendon       arch taping  AM  AM  AM       Re-eval  performed           Exercise Diary                Therapeutic Exercise               Bike- ROM   5 mins      TM        "  Short foot 10\"10x 3\"20x  3\"20x   3\"20x       [facility]              Lateral step down             Seated heel toe raises  3\"15x ea  3\"15x ea  3\"15x ea       Ankle inversion w TB  GTB  3\"15x  GTB  3\"25x         4 way ankle    RTB  3\"10x ea     Calf stretch  20\"3x long sitting  20\"3x         Eversion S  20\"3x 20\"3x      1st toe TB flex/ext   GTB  3\"15x ea RTB  3\"15x ea     toe yoga      3\"20x ea  3\"20x ea       Neuromuscular Re-education               SLB             Heel toe walking                                                                                              Therapeutic Activities               Step up and over              lateral step up                                                               Modalities                                       "

## 2024-04-18 ENCOUNTER — OFFICE VISIT (OUTPATIENT)
Dept: PHYSICAL THERAPY | Facility: CLINIC | Age: 54
End: 2024-04-18
Payer: COMMERCIAL

## 2024-04-18 DIAGNOSIS — M76.821 POSTERIOR TIBIAL TENDINITIS OF RIGHT LOWER EXTREMITY: Primary | ICD-10-CM

## 2024-04-18 PROCEDURE — 97112 NEUROMUSCULAR REEDUCATION: CPT

## 2024-04-18 PROCEDURE — 97110 THERAPEUTIC EXERCISES: CPT

## 2024-04-22 ENCOUNTER — OFFICE VISIT (OUTPATIENT)
Dept: PHYSICAL THERAPY | Facility: CLINIC | Age: 54
End: 2024-04-22
Payer: COMMERCIAL

## 2024-04-22 DIAGNOSIS — M76.821 POSTERIOR TIBIAL TENDINITIS OF RIGHT LOWER EXTREMITY: Primary | ICD-10-CM

## 2024-04-22 PROCEDURE — 97140 MANUAL THERAPY 1/> REGIONS: CPT

## 2024-04-22 PROCEDURE — 97112 NEUROMUSCULAR REEDUCATION: CPT

## 2024-04-22 PROCEDURE — 97110 THERAPEUTIC EXERCISES: CPT

## 2024-04-22 NOTE — PROGRESS NOTES
"Daily Note     Today's date: 2024  Patient name: Kristi Sena  : 1970  MRN: [id number]  Referring provider: Luigi Crabtree DO  Dx:   Encounter Diagnosis     ICD-10-CM    1. Posterior tibial tendinitis of right lower extremity  M76.821           Start Time: 0800  Stop Time: 08  Total time in clinic (min): 42 minutes    Subjective: Th front of the shin pain is getting better, I was in the boot most of the weekend with no issues, when I have the foot taped I can walk without the boot with no pain.       Objective: See treatment diary below      Assessment: Tolerated treatment well. Added in SLB today, pt reported some discomfort that was decreased when she performed a short foot lift. Taped foot and then trialed step up and over. Pt reported no increased pain with forward however had some pain lateral. Patient demonstrated fatigue post treatment, exhibited good technique with therapeutic exercises, and would benefit from continued PT      Plan: Continue per plan of care.  Progress treatment as tolerated.       POC Expires 24   Auth Status No approval necessary   Unit limit 3   Expiration date 24   PT/OT Limit 24 visits     HEP Code= [id number]     Diagnosis:  Posterior tibial tendonitis R   Precautions:  chronic post tib tendon dysfunction, ESWT only 1 x a week   Comparable signs Stairs, walking, standing   Primary Impairments: R ankle DF, inversion strength   Patient Goals Walk without pain   Date 4/1 4/9 4/10 4/18 4/22    Used 1 2 3 4 5    Remaining 23 22 21 20 19    Manual Therapy             Subtalar mobs med/lat, gr 4    AM    AM     GT ext mob, gr 4    AM    AM     ESWT post tib tendon  2500 p small metal, 1.5, 21 Hz    D20-S head, 1.5, 21hz  Post tib tendon  D20-S head, 1.4, 21hz  Post tib tendon       arch taping  AM  AM  AM  AM     Re-eval  performed           Exercise Diary                Therapeutic Exercise               Bike- ROM   5 mins  5  mins    TM         Short foot 10\"10x " "3\"20x  3\"20x   3\"20x 3\"20x       [facility]              Lateral step down             squats     Arch taped  10x    Seated heel toe raises  3\"15x ea  3\"15x ea  3\"15x ea  3\"2x10     Ankle inversion w TB  GTB  3\"15x  GTB  3\"25x         4 way ankle    RTB  3\"10x ea RTB  3\"15x ea    Calf stretch  20\"3x long sitting  20\"3x    gastroc & soleus  20\"3x ea     Eversion S  20\"3x 20\"3x  20\"3x    1st toe TB flex/ext   GTB  3\"15x ea RTB  3\"15x ea RTB  3\"15x ea    toe yoga      3\"20x ea  3\"20x ea  3\"20x ea     Neuromuscular Re-education               SLB        10\"3x  W/short foot     Heel toe walking                                                                                              Therapeutic Activities               Step up and over         4in  10x     lateral step up          4in  10x                                                      Modalities                                         "

## 2024-04-24 ENCOUNTER — TELEPHONE (OUTPATIENT)
Age: 54
End: 2024-04-24

## 2024-04-24 ENCOUNTER — OFFICE VISIT (OUTPATIENT)
Dept: PHYSICAL THERAPY | Facility: CLINIC | Age: 54
End: 2024-04-24
Payer: COMMERCIAL

## 2024-04-24 DIAGNOSIS — M76.821 POSTERIOR TIBIAL TENDINITIS OF RIGHT LOWER EXTREMITY: Primary | ICD-10-CM

## 2024-04-24 PROCEDURE — 97140 MANUAL THERAPY 1/> REGIONS: CPT

## 2024-04-24 PROCEDURE — 97112 NEUROMUSCULAR REEDUCATION: CPT

## 2024-04-24 PROCEDURE — 97110 THERAPEUTIC EXERCISES: CPT

## 2024-04-24 NOTE — PROGRESS NOTES
"Daily Note     Today's date: 2024  Patient name: Kristi Sena  : 1970  MRN: [id number]  Referring provider: Luigi Crabtree DO  Dx:   Encounter Diagnosis     ICD-10-CM    1. Posterior tibial tendinitis of right lower extremity  M76.821           Start Time: 0845  Stop Time: 09  Total time in clinic (min): 45 minutes    Subjective: When I have the boot on I'm okay, as soon as its off its the sharp pain about a 7/10.      Objective: See treatment diary below      Assessment: Tolerated treatment well. Progressed ankle strengthening today. Pt challenged by posterior tib fatigue. Increased ease with exercise when arch is taped. Patient demonstrated fatigue post treatment, exhibited good technique with therapeutic exercises, and would benefit from continued PT      Plan: Continue per plan of care.  Progress treatment as tolerated.       POC Expires 24   Auth Status No approval necessary   Unit limit 3   Expiration date 24   PT/OT Limit 24 visits     HEP Code= [email or web address]     Diagnosis:  Posterior tibial tendonitis R   Precautions:  chronic post tib tendon dysfunction, ESWT only 1 x a week   Comparable signs Stairs, walking, standing   Primary Impairments: R ankle DF, inversion strength   Patient Goals Walk without pain   Date 4/1 4/9 4/10 4/18 4/22 4/24   Used 1 2 3 4 5 6   Remaining 23 22 21 20 19 18   Manual Therapy             Subtalar mobs med/lat, gr 4    AM    AM     GT ext mob, gr 4    AM    AM     ESWT post tib tendon  2500 p small metal, 1.5, 21 Hz    D20-S head, 1.5, 21hz  Post tib tendon  D20-S head, 1.4, 21hz  Post tib tendon  D20-S head, 1.6, 21hz  Post tib tendon, achillies  D20-S head, 1.6, 21hz  Post tib tendon, achillies   arch taping  AM  AM  AM  AM  AM   Re-eval  performed           Exercise Diary                Therapeutic Exercise               Bike- ROM   5 mins  5  mins    TM         Short foot 10\"10x 3\"20x  3\"20x   3\"20x 3\"20x       [facility]              Lateral step down   " "          squats     Arch taped  10x Arch taped  10x   Seated heel toe raises  3\"15x ea  3\"15x ea  3\"15x ea  3\"2x10  5lb  3\"25x ea   Ankle inversion w TB  GTB  3\"15x  GTB  3\"25x         4 way ankle    RTB  3\"10x ea RTB  3\"15x ea RTB  3\"15x ea   Calf stretch  20\"3x long sitting  20\"3x    gastroc & soleus  20\"3x ea gastroc & soleus  20\"3x ea   Eversion S  20\"3x 20\"3x  20\"3x 20\"3x   Plantar fascia S      20\"3x   1st toe TB flex/ext   GTB  3\"15x ea RTB  3\"15x ea RTB  3\"15x ea RTB  3\"20x ea   toe yoga      3\"20x ea  3\"20x ea  3\"20x ea     Neuromuscular Re-education               SLB        10\"3x  W/short foot  10\"5x  W/short foot   Heel toe walking                                                                                              Therapeutic Activities               Step up and over         4in  10x     lateral step up          4in  10x                                                      Modalities                                           "

## 2024-04-24 NOTE — TELEPHONE ENCOUNTER
Caller: Kristi     Doctor: Bro    Reason for call: Patient was fitted for a  custom foot orthotic and is calling for the status. Please advise.    Call back#: [phone]

## 2024-04-29 ENCOUNTER — APPOINTMENT (OUTPATIENT)
Dept: PHYSICAL THERAPY | Facility: CLINIC | Age: 54
End: 2024-04-29
Payer: COMMERCIAL

## 2024-04-30 ENCOUNTER — OFFICE VISIT (OUTPATIENT)
Dept: PHYSICAL THERAPY | Facility: OTHER | Age: 54
End: 2024-04-30
Payer: COMMERCIAL

## 2024-04-30 DIAGNOSIS — M76.821 POSTERIOR TIBIAL TENDINITIS OF RIGHT LOWER EXTREMITY: Primary | ICD-10-CM

## 2024-04-30 PROCEDURE — 97763 ORTHC/PROSTC MGMT SBSQ ENC: CPT | Performed by: PHYSICAL THERAPIST

## 2024-04-30 NOTE — PROGRESS NOTES
Custom orthotics fitted to patients shoe and dispensed. Orthotics are significantly wider than the [employer]idas sneaker she is currently in. We discussed having the patient take the orthotics to a [facility] to be fitted with a new sneaker. Patient educated on appropriate break in period. Patient will follow up if any future problems arise. She may need trimming of length of orthotic once she finds a shoe she likes.

## 2024-05-01 ENCOUNTER — OFFICE VISIT (OUTPATIENT)
Dept: PHYSICAL THERAPY | Facility: CLINIC | Age: 54
End: 2024-05-01
Payer: COMMERCIAL

## 2024-05-01 DIAGNOSIS — M76.821 POSTERIOR TIBIAL TENDINITIS OF RIGHT LOWER EXTREMITY: Primary | ICD-10-CM

## 2024-05-01 PROCEDURE — 97112 NEUROMUSCULAR REEDUCATION: CPT

## 2024-05-01 PROCEDURE — 97140 MANUAL THERAPY 1/> REGIONS: CPT

## 2024-05-01 PROCEDURE — 97110 THERAPEUTIC EXERCISES: CPT

## 2024-05-01 NOTE — PROGRESS NOTES
Daily Note     Today's date: 2024  Patient name: Kristi eSna  : 1970  MRN: [id number]  Referring provider: Luigi Crabtree DO  Dx:   Encounter Diagnosis     ICD-10-CM    1. Posterior tibial tendinitis of right lower extremity  M76.821           Start Time: 08  Stop Time: 930  Total time in clinic (min): 45 minutes    Subjective: I got my orthotics I still have that same stabby pain in the foot with stiars and walking. Pain is about a 5/10 today.      Objective: See treatment diary below      Assessment: Tolerated treatment well. Progress exercises to standing today, pt was able to tolerate more standing activity without increasing foot pain. Feeling better leaving the session. Patient demonstrated fatigue post treatment, exhibited good technique with therapeutic exercises, and would benefit from continued PT      Plan: Continue per plan of care.  Progress treatment as tolerated.       POC Expires 24   Auth Status No approval necessary   Unit limit 3   Expiration date 24   PT/OT Limit 24 visits     HEP Code= [email or web address]     Diagnosis:  Posterior tibial tendonitis R   Precautions:  chronic post tib tendon dysfunction, ESWT only 1 x a week   Comparable signs Stairs, walking, standing   Primary Impairments: R ankle DF, inversion strength   Patient Goals Walk without pain   Date 5/1 4/9 4/10 4/18 4/22 4/24   Used 7 2 3 4 5 6   Remaining 17 22 21 20 19 18   Manual Therapy             Subtalar mobs med/lat, gr 4 AM   AM    AM     GT ext mob, gr 4 AM   AM    AM     ESWT post tib tendon  D20-S head, 1.4, 21hz  Post tib tendon, achillies   D20-S head, 1.5, 21hz  Post tib tendon  D20-S head, 1.4, 21hz  Post tib tendon  D20-S head, 1.6, 21hz  Post tib tendon, achillies  D20-S head, 1.6, 21hz  Post tib tendon, achillies   arch taping  AM  AM  AM  AM  AM   Re-eval  performed           Exercise Diary               Therapeutic Exercise              Bike- ROM 5 mins  5 mins  5  mins    TM         Short foot   "3\"20x  3\"20x   3\"20x 3\"20x       Laron walk outs         [facility]              Lateral step down             squats 15x    Arch taped  10x Arch taped  10x   Heel toe raises Standing  3\"10x ea        Seated heel toe raises 5lb  3\"20x 3\"15x ea  3\"15x ea  3\"15x ea  3\"2x10  5lb  3\"25x ea   Ankle inversion w TB  GTB  3\"15x  GTB  3\"25x         4 way ankle RTB  3\"20x ea   RTB  3\"10x ea RTB  3\"15x ea RTB  3\"15x ea   Calf stretch gastroc & soleus  20\"3x ea  standing 20\"3x long sitting  20\"3x    gastroc & soleus  20\"3x ea gastroc & soleus  20\"3x ea   Eversion S  20\"3x 20\"3x  20\"3x 20\"3x   Plantar fascia S 20\"3x     20\"3x   1st toe TB flex/ext RTB  3\"20x ea  GTB  3\"15x ea RTB  3\"15x ea RTB  3\"15x ea RTB  3\"20x ea   toe yoga     3\"20x ea  3\"20x ea  3\"20x ea     Neuromuscular Re-education              SLB Airex  Shoes on  10\"5x       10\"3x  W/short foot  10\"5x  W/short foot   Heel toe walking                                                                                             Therapeutic Activities              Step up and over 4in  10x        4in  10x     lateral step up 4in  15x        4in  10x      leg press 50lb  20x                                            Modalities                                             "

## 2024-05-02 NOTE — PROGRESS NOTES
PT Re-Evaluation     Today's date: 2024  Patient name: Kristi Sena  : 1970  MRN: [id number]  Referring provider: Luigi rCabtree DO  Dx:   Encounter Diagnosis     ICD-10-CM    1. Posterior tibial tendinitis of right lower extremity  M76.821               Start Time: 0800  Stop Time: 0845  Total time in clinic (min): 45 minutes    Assessment  Assessment details: Pt presents to PT today with R posterior tibial tendonitis that started about a month ago with no known SEVERO. She has completed 9 OPPT visits and is now amb with her orthotics and supportive sneakers and reports improvements in her pain and function since getting the orthotics. She is still challenged with heavy house hold tasks and pain when not in a shoe.  Their symptoms include decreasing R medial ankle pain and are increased when performing prolonged walking, standing, and stair negotiation. The pt presented with now normal R ankle ROM. Strength is limited by pain in inversion and SL heel raises continues to produce pain in the R foot which could be limiting her ability to negotiate stairs.  The pt will benefit from skilled PT to address the above limitations.         Impairments: abnormal gait, abnormal muscle firing, abnormal or restricted ROM, activity intolerance, impaired balance, impaired physical strength, lacks appropriate home exercise program, pain with function and weight-bearing intolerance    Symptom irritability: moderateUnderstanding of Dx/Px/POC: good   Prognosis: good    Goals  STG's to be achieved in 8 visits:  1) Patient will be fitted with custom foot orthotic. - MET  2) Patient will improve LE strength by 1/2 muscle grade.- MET  3) Patient will demonstrate normal pain free walking gait. - MET  4) Pt will improve DF ROM by 5 deg- MET  5) Pt will improve Eversion ROM by 5 deg- MET    LTG's to be achieved in 12 visits:  1) Patient will be independent and compliant with HEP. - MET  2) Patient will return to recreational  activities with no greater than 2/10 foot pain.   3) Patient garza score 75 or greater on FOTO. - PROGRESSING  4) pt will have full ankle ROM- MET  5) Pt will have full ankle strength- PROGRESSING    Plan  Patient would benefit from: skilled physical therapy  Planned modality interventions: ultrasound, unattended electrical stimulation, manual electrical stimulation, electrical stimulation/Russian stimulation, cryotherapy and thermotherapy: hydrocollator packs  Other planned modality interventions: EPAT  Planned therapy interventions: activity modification, ADL retraining, ADL training, balance, balance/weight bearing training, flexibility, functional ROM exercises, gait training, home exercise program, therapeutic exercise, therapeutic activities, stretching, strengthening, patient education, orthotic management and training, orthotic fitting/training, neuromuscular re-education, manual therapy, kinesiology taping, joint mobilization and IASTM  Frequency: 2x week  Duration in visits: 6  Treatment plan discussed with: patient        Subjective Evaluation    History of Present Illness  Mechanism of injury: Patient reports a chronic pain her right ankle for about 2 months. She was placed in a CAM boot about 3 weeks ago and does note improvement in ankle pain with boot. Patient has treated with steroid as well without any improvement. 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     Hx: breast of cancer ~7 years ago, tongue cancer 15 years    - 2 months ago I noticed foot pain when I was working cleaning house  - steps can be a challenge, walking without shoes  - when I wake up in the morning I dont have any pain  -wearing CAM boot for about a month and will wear until orthotics come in  - no numbness and tingling  - sharp pain in the inside of my R foot  - sleep unaffected  - no previous trauma or injury to the foot  - gardening and cooking I enjoy in my free time    Re-eval 5/6/24  I can still the  pain when I am barefoot but now that I have the orthotics I can do more with less pain, the back isnt bothering me as much. I a no longer getting the anterior shin pain. Steps can still be a challenge due to the pain. I havent tried to do heavy house tasks yet or prolonged activity like I used to. 75% improved.With the orthotics and sneakers I can walk about 4-5 hours with no pain, with no shoes I notice it in a bout a few minutes.  Patient Goals  Patient goals for therapy: increased strength, decreased pain and increased motion  Patient goal: walk without any pain  Pain  Current pain ratin  At best pain ratin  At worst pain ratin  Location: r medial foot  Quality: sharp  Aggravating factors: stair climbing, standing and walking    Social Support  Lives in: multiple-level home  Lives with: spouse    Employment status: not working  Treatments  No previous or current treatments        Objective     Tenderness     Right Ankle/Foot   Tenderness in the medial malleolus and posterior tibial tendon.     Active Range of Motion   Left Ankle/Foot   Dorsiflexion (ke): 25 degrees   Plantar flexion: 55 degrees   Inversion: 43 degrees   Eversion: 33 degrees     Right Ankle/Foot   Dorsiflexion (ke): 25 degrees   Plantar flexion: 55 degrees   Inversion: 47 degrees   Eversion: 35 degrees     Strength/Myotome Testing     Right Ankle/Foot   Dorsiflexion: 5  Plantar flexion: 5  Inversion: 4+  Eversion: 5  Great toe flexion: 5  Great toe extension: 5    Tests     Additional Tests Details  Unable to perform more then 4 heel raises without increasing pain    Swelling   Left Ankle/Foot   Metatarsal heads: 23 cm  Figure 8: 54 cm  Malleoli: 26 cm    Right Ankle/Foot   Metatarsal heads: 23 cm  Figure 8: 54.5 cm  Malleoli: 26 cm                 POC Expires 24   Auth Status No approval necessary   Unit limit 3   Expiration date 24   PT/OT Limit 24 visits     HEP Code= [email or web address]     Diagnosis:  Posterior tibial tendonitis R  "  Precautions:  chronic post tib tendon dysfunction, ESWT only 1 x a week   Comparable signs Stairs, walking, standing   Primary Impairments: R ankle DF, inversion strength   Patient Goals Walk without pain   Date 5/1 5/6 4/10 4/18 4/22 4/24   Used 7 9 3 4 5 6   Remaining 17 16 21 20 19 18   Manual Therapy             Subtalar mobs med/lat, gr 4 AM   AM    AM     GT ext mob, gr 4 AM   AM    AM     ESWT post tib tendon  D20-S head, 1.4, 21hz  Post tib tendon, achillies   D20-S head, 1.5, 21hz  Post tib tendon  D20-S head, 1.4, 21hz  Post tib tendon  D20-S head, 1.6, 21hz  Post tib tendon, achillies  D20-S head, 1.6, 21hz  Post tib tendon, achillies   arch taping    AM  AM  AM  AM   Re-eval  performed           Exercise Diary              Therapeutic Exercise             Bike- ROM 5 mins 5 mins 5 mins  5  mins    TM         Short foot  3\"20x  3\"20x   3\"20x 3\"20x       [place]field walk outs         [facility]              Lateral step down             squats 15x 15x   Arch taped  10x Arch taped  10x   Heel toe raises Standing  3\"10x ea Standing  3\"10x ea       Seated heel toe raises 5lb  3\"20x 10lb  3\"15x   3\"15x ea  3\"15x ea  3\"2x10  5lb  3\"25x ea   Ankle inversion w TB    GTB  3\"25x         4 way ankle RTB  3\"20x ea GTB  3\"15x ea  RTB  3\"10x ea RTB  3\"15x ea RTB  3\"15x ea   Calf stretch gastroc & soleus  20\"3x ea  standing gastroc & soleus  20\"3x ea  standing long sitting  20\"3x    gastroc & soleus  20\"3x ea gastroc & soleus  20\"3x ea   Eversion S   20\"3x  20\"3x 20\"3x   Plantar fascia S 20\"3x 20\"3x     20\"3x   1st toe TB flex/ext RTB  3\"20x ea  GTB  3\"15x ea RTB  3\"15x ea RTB  3\"15x ea RTB  3\"20x ea   toe yoga    3\"20x ea  3\"20x ea  3\"20x ea     Neuromuscular Re-education             SLB Airex  Shoes on  10\"5x Airex  Shoes off  20\"  10\"  18\"      10\"3x  W/short foot  10\"5x  W/short foot   Heel toe walking                                                                                            Therapeutic Activities     "         Step up and over 4in  10x 6in  10x      4in  10x     lateral step up 4in  15x 6in  10x      4in  10x      leg press 50lb  20x                                          Modalities

## 2024-05-06 ENCOUNTER — EVALUATION (OUTPATIENT)
Dept: PHYSICAL THERAPY | Facility: CLINIC | Age: 54
End: 2024-05-06
Payer: COMMERCIAL

## 2024-05-06 DIAGNOSIS — M76.821 POSTERIOR TIBIAL TENDINITIS OF RIGHT LOWER EXTREMITY: Primary | ICD-10-CM

## 2024-05-06 PROCEDURE — 97110 THERAPEUTIC EXERCISES: CPT

## 2024-05-06 PROCEDURE — 97140 MANUAL THERAPY 1/> REGIONS: CPT

## 2024-05-09 ENCOUNTER — OFFICE VISIT (OUTPATIENT)
Dept: PHYSICAL THERAPY | Facility: CLINIC | Age: 54
End: 2024-05-09
Payer: COMMERCIAL

## 2024-05-09 DIAGNOSIS — M76.821 POSTERIOR TIBIAL TENDINITIS OF RIGHT LOWER EXTREMITY: Primary | ICD-10-CM

## 2024-05-09 PROCEDURE — 97110 THERAPEUTIC EXERCISES: CPT

## 2024-05-09 PROCEDURE — 97140 MANUAL THERAPY 1/> REGIONS: CPT

## 2024-05-09 NOTE — PROGRESS NOTES
Daily Note     Today's date: 2024  Patient name: Kristi Sena  : 1970  MRN: [id number]  Referring provider: Luigi Crabtree DO  Dx:   Encounter Diagnosis     ICD-10-CM    1. Posterior tibial tendinitis of right lower extremity  M76.821           Start Time: 745  Stop Time: 828  Total time in clinic (min): 43 minutes    Subjective: I still have pain with walking I tried to walk about 10 minutes yesterday and I had the same sharp stabbing pain in the foot even with wearing the orthotics.      Objective: See treatment diary below      Assessment: Tolerated treatment well. Progressed toe strengthening today by progressing to green TB. Pt reports increased pain today when stepping down the 6 in step. 5/10 pain leaving which was an increase from the 3/10 she came in with. Patient demonstrated fatigue post treatment, exhibited good technique with therapeutic exercises, and would benefit from continued PT      Plan: Continue per plan of care.  Progress treatment as tolerated.       POC Expires 24   Auth Status No approval necessary   Unit limit 3   Expiration date 24   PT/OT Limit 24 visits     HEP Code= [email or web address]     Diagnosis:  Posterior tibial tendonitis R   Precautions:  chronic post tib tendon dysfunction, ESWT only 1 x a week   Comparable signs Stairs, walking, standing   Primary Impairments: R ankle DF, inversion strength   Patient Goals Walk without pain   Date    Used 7 9 10 4 5 6   Remaining 17 16 15 20 19 18   Manual Therapy            Subtalar mobs med/lat, gr 4 AM      AM     GT ext mob, gr 4 AM      AM     ESWT post tib tendon  D20-S head, 1.4, 21hz  Post tib tendon, achillies  D20-S head, 1.4, 21hz  Post tib tendon, achillies  D20-S head, 1.4, 21hz  Post tib tendon  D20-S head, 1.6, 21hz  Post tib tendon, achillies  D20-S head, 1.6, 21hz  Post tib tendon, achillies   arch taping     AM  AM  AM   Re-eval  performed          Exercise Diary             Therapeutic  "Exercise            Bike- ROM 5 mins 5 mins Lvl 2  5 mins  5  mins    TM         Short foot  3\"20x   3\"20x 3\"20x       Laron walk outs         [facility]             Lateral step down            squats 15x 15x   Arch taped  10x Arch taped  10x   Heel toe raises Standing  3\"10x ea Standing  3\"10x ea Standing  3\"15x ea      Seated heel toe raises 5lb  3\"20x 10lb  3\"15x  10lb  3\"15x  3\"15x ea  3\"2x10  5lb  3\"25x ea   Ankle inversion w TB            4 way ankle RTB  3\"20x ea GTB  3\"15x ea GTB  3\"15x ea RTB  3\"10x ea RTB  3\"15x ea RTB  3\"15x ea   Calf stretch gastroc & soleus  20\"3x ea  standing gastroc & soleus  20\"3x ea  standing gastroc & soleus  20\"3x ea  standing    gastroc & soleus  20\"3x ea gastroc & soleus  20\"3x ea   Eversion S     20\"3x 20\"3x   Plantar fascia S 20\"3x 20\"3x  20\"3x   20\"3x   1st toe TB flex/ext RTB  3\"20x ea  GTB  3\"15x ea RTB  3\"15x ea RTB  3\"15x ea RTB  3\"20x ea   toe yoga     3\"20x ea  3\"20x ea     Neuromuscular Re-education            SLB Airex  Shoes on  10\"5x Airex  Shoes off  20\"  10\"  18\"     10\"3x  W/short foot  10\"5x  W/short foot   Heel toe walking                                                                                     Therapeutic Activities            Step up and over 4in  10x 6in  10x 6in  10x    4in  10x     lateral step up 4in  15x 6in  10x 6in  10x    4in  10x      leg press 50lb  20x  65lb  20x                                       Modalities                                     "

## 2024-05-13 ENCOUNTER — APPOINTMENT (OUTPATIENT)
Dept: PHYSICAL THERAPY | Facility: CLINIC | Age: 54
End: 2024-05-13
Payer: COMMERCIAL

## 2024-05-20 ENCOUNTER — OFFICE VISIT (OUTPATIENT)
Dept: OBGYN CLINIC | Facility: CLINIC | Age: 54
End: 2024-05-20
Payer: COMMERCIAL

## 2024-05-20 VITALS — WEIGHT: 216 LBS | HEIGHT: 64 IN | BODY MASS INDEX: 36.88 KG/M2

## 2024-05-20 DIAGNOSIS — M76.821 POSTERIOR TIBIAL TENDINITIS OF RIGHT LOWER EXTREMITY: Primary | ICD-10-CM

## 2024-05-20 PROCEDURE — 99212 OFFICE O/P EST SF 10 MIN: CPT | Performed by: ORTHOPAEDIC SURGERY

## 2024-05-20 NOTE — PROGRESS NOTES
Assessment:  1. 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 female  with posterior tibial tendinitis versus tear    Discussed MRI of the right ankle to rule out posterior tibial tear secondary to persistent pain with physical therapy and orthotics.  MRI prescription placed today.  Discussed patient to follow-up after MRI    The assessment and plan were formulated by Dr. Crabtree and I assisted in carrying it out.    Subjective:   Patient ID: Kristi Sena is a 53 y.o. female .    HPI    Patient presents to the office for follow up of right ankle pain.  Notes persistent posterior medial ankle pain.  Notes no relief with physical therapy and orthotics.  Patient notes moderate relief with use of cam boot.    The following portions of the patient's history were reviewed and updated as appropriate: allergies, current medications, past family history, past social history, past surgical history and problem list.    Social History     Socioeconomic History    Marital status: /Civil Union     Spouse name: Not on file    Number of children: Not on file    Years of education: Not on file    Highest education level: Not on file   Occupational History    Not on file   Tobacco Use    Smoking status: Never    Smokeless tobacco: Never   Vaping Use    Vaping status: Never Used   Substance and Sexual Activity    Alcohol use:  Yes     Comment: Social    Drug use: No    Sexual activity: Not on file   Other Topics Concern    Not on file   Social History Narrative    Drinks Tea     Social Determinants of Health     Financial Resource Strain: Not on file   Food Insecurity: Not on file   Transportation Needs: Not on file   Physical Activity: Not on file   Stress: Not on file   Social Connections: Not on file   Intimate Partner Violence: Not on file   Housing Stability: Not on file     Past Medical History:   Diagnosis Date    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    Anxiety     Cancer (HCC)     tongue     GERD (gastroesophageal reflux disease)     Herpes zoster     last Assessed     Hypertension     Shingles      Past Surgical History:   Procedure Laterality Date    BREAST LUMPECTOMY Left 2019    Procedure: BREAST LUMPECTOMY; BREAST NEEDLE LOCALIZATION (NEEDLE LOC AT 1130);  Surgeon: Sreedhar Dunn MD;  Location: [facility];  Service: Surgical Oncology     SECTION      GLOSSECTOMY Bilateral      Complete with Uniliateral Radical Neck Disscetion    MAMMO NEEDLE LOCALIZATION LEFT (ALL INC) Left 2019    MAMMO STEREOTACTIC BREAST BIOPSY LEFT ([facility]) Left 2018    TONGUE SURGERY      BX tongue location base     Allergies   Allergen Reactions    Penicillins Throat Swelling     Annotation - 2016: as achild    Seasonal Ic [Cholestatin]     Sulfites - Food Allergy Hives, Rash and Throat Swelling     Current Outpatient Medications on File Prior to Visit   Medication Sig Dispense Refill    FLUoxetine (PROzac) 40 MG capsule TAKE 1 CAPSULE (40 MG TOTAL) BY MOUTH DAILY. 90 capsule 1    fluticasone (FLONASE) 50 mcg/act nasal spray SPRAY 2 SPRAYS INTO EACH NOSTRIL EVERY DAY 16 mL 0    lisinopril (ZESTRIL) 10 mg tablet TAKE 1 TABLET BY MOUTH EVERY DAY 90 tablet 1    methylPREDNISolone 4 MG tablet therapy pack Use as directed on package 1 each 0    pantoprazole (PROTONIX) 40 mg tablet TAKE 1 TABLET BY MOUTH EVERY DAY 30  tablet 5     No current facility-administered medications on file prior to visit.       Review of Systems   Constitutional:  Negative for chills and fever.   HENT:  Negative for ear pain and sore throat.    Eyes:  Negative for pain and visual disturbance.   Respiratory:  Negative for cough and shortness of breath.    Cardiovascular:  Negative for chest pain and palpitations.   Gastrointestinal:  Negative for abdominal pain and vomiting.   Genitourinary:  Negative for dysuria and hematuria.   Musculoskeletal:  Positive for arthralgias. Negative for back pain.   Skin:  Negative for color change and rash.   Neurological:  Negative for seizures and syncope.   All other systems reviewed and are negative.    See HPi    Objective:    There were no vitals filed for this visit.    Physical Exam  Vitals and nursing note reviewed.   Constitutional:       General: She is not in acute distress.     Appearance: She is well-developed.   HENT:      Head: Normocephalic and atraumatic.   Eyes:      Conjunctiva/sclera: Conjunctivae normal.   Cardiovascular:      Rate and Rhythm: Normal rate and regular rhythm.      Heart sounds: No murmur heard.  Pulmonary:      Effort: Pulmonary effort is normal. No respiratory distress.      Breath sounds: Normal breath sounds.   Abdominal:      Palpations: Abdomen is soft.      Tenderness: There is no abdominal tenderness.   Musculoskeletal:         General: No swelling.      Cervical back: Neck supple.   Skin:     General: Skin is warm and dry.      Capillary Refill: Capillary refill takes less than 2 seconds.   Neurological:      Mental Status: She is alert.   Psychiatric:         Mood and Affect: Mood normal.         Right Ankle Exam     Tenderness   Right ankle tenderness location: Posterior tibial.    Tests   Anterior drawer: negative    Other   Sensation: normal  Pulse: present     Comments:  Pain with inversion and eversion of the ankle  No pain with plantarflexion and extension of the  "ankle              Procedures  No Procedures performed today           Portions of the record may have been created with voice recognition software.  Occasional wrong word or \"sound a like\" substitutions may have occurred due to the inherent limitations of voice recognition software.  Read the chart carefully and recognize, using context, where substitutions have occurred.   "

## 2024-05-21 ENCOUNTER — APPOINTMENT (OUTPATIENT)
Dept: PHYSICAL THERAPY | Facility: CLINIC | Age: 54
End: 2024-05-21
Payer: COMMERCIAL

## 2024-05-23 ENCOUNTER — APPOINTMENT (OUTPATIENT)
Dept: PHYSICAL THERAPY | Facility: CLINIC | Age: 54
End: 2024-05-23
Payer: COMMERCIAL

## 2024-05-30 ENCOUNTER — HOSPITAL ENCOUNTER (OUTPATIENT)
Dept: MRI IMAGING | Facility: HOSPITAL | Age: 54
End: 2024-05-30
Payer: COMMERCIAL

## 2024-05-30 DIAGNOSIS — M76.821 POSTERIOR TIBIAL TENDINITIS OF RIGHT LOWER EXTREMITY: ICD-10-CM

## 2024-05-30 PROCEDURE — 73721 MRI JNT OF LWR EXTRE W/O DYE: CPT

## 2024-06-06 ENCOUNTER — OFFICE VISIT (OUTPATIENT)
Dept: OBGYN CLINIC | Facility: CLINIC | Age: 54
End: 2024-06-06
Payer: COMMERCIAL

## 2024-06-06 VITALS — HEIGHT: 64 IN | WEIGHT: 216 LBS | BODY MASS INDEX: 36.88 KG/M2

## 2024-06-06 DIAGNOSIS — M76.821 POSTERIOR TIBIAL TENDINITIS OF RIGHT LOWER EXTREMITY: Primary | ICD-10-CM

## 2024-06-06 PROCEDURE — 99213 OFFICE O/P EST LOW 20 MIN: CPT | Performed by: ORTHOPAEDIC SURGERY

## 2024-06-06 NOTE — PROGRESS NOTES
"Assessment:  1. 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 female  with severe posterior tibial tendon tenosynovitis without tear  Diagnostics reviewed and physical exam performed.  Diagnosis, treatment options and associated risks were discussed with the patient including no treatment, nonsurgical treatment and potential for surgical intervention.  The patient was given the opportunity to ask questions regarding each.  MRI right ankle patient, which demonstrates severe posterior tibialis tenosynovitis without tear as well as a chronic avulsion fracture associated with the anterior talofibular ligament.  Her pain has been refractory to cam boot, Medrol Dosepak, oral NSAIDs, rest, ice, activity modification, use of orthotics and more supportive shoe.  Referral placed today for her to continue to follow-up with Dr. Marcano for possible ultrasound-guided posterior tibial tendon cortisone injection  Will plan to see her back on an as-needed basis      Subjective:   Patient ID: Kristi Sena is a 53 y.o. female .    HPI    Patient presents to the office for follow up of right ankle pain and to review MRI results.  She has been wearing CAM boot with minimal relief. She also followed up with Norma Cunningham DPT for custom orthotics and has also recently purchased new sneakers. She notes a \"slicing\" type of " pain with weight bearing, ambulating, especially when barefoot.     The following portions of the patient's history were reviewed and updated as appropriate: allergies, current medications, past family history, past social history, past surgical history and problem list.    Social History     Socioeconomic History    Marital status: /Civil Union     Spouse name: Not on file    Number of children: Not on file    Years of education: Not on file    Highest education level: Not on file   Occupational History    Not on file   Tobacco Use    Smoking status: Never    Smokeless tobacco: Never   Vaping Use    Vaping status: Never Used   Substance and Sexual Activity    Alcohol use: Yes     Comment: Social    Drug use: No    Sexual activity: Not on file   Other Topics Concern    Not on file   Social History Narrative    Drinks Tea     Social Determinants of Health     Financial Resource Strain: Not on file   Food Insecurity: Not on file   Transportation Needs: Not on file   Physical Activity: Not on file   Stress: Not on file   Social Connections: Not on file   Intimate Partner Violence: Not on file   Housing Stability: Not on file     Past Medical History:   Diagnosis Date    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    Anxiety     Cancer (HCC)     tongue     GERD (gastroesophageal reflux disease)     Herpes zoster     last Assessed     Hypertension     Shingles      Past Surgical History:   Procedure Laterality Date    BREAST LUMPECTOMY Left 2019    Procedure: BREAST LUMPECTOMY; BREAST NEEDLE LOCALIZATION (NEEDLE LOC AT 1130);  Surgeon: Sreedhar Dunn MD;  Location: [facility];  Service: Surgical Oncology     SECTION      GLOSSECTOMY Bilateral      Complete with Uniliateral Radical Neck Disscetion    MAMMO NEEDLE LOCALIZATION LEFT (ALL INC) Left 2019    MAMMO STEREOTACTIC BREAST BIOPSY LEFT (ALL INC) Left 2018    TONGUE SURGERY      BX tongue location base     Allergies   Allergen Reactions     Penicillins Throat Swelling     [facility] - [id number]: as achild    Seasonal Ic [Cholestatin]     Sulfites - Food Allergy Hives, Rash and Throat Swelling     Current Outpatient Medications on File Prior to Visit   Medication Sig Dispense Refill    FLUoxetine (PROzac) 40 MG capsule TAKE 1 CAPSULE (40 MG TOTAL) BY MOUTH DAILY. 90 capsule 1    fluticasone (FLONASE) 50 mcg/act nasal spray SPRAY 2 SPRAYS INTO EACH NOSTRIL EVERY DAY 16 mL 0    lisinopril (ZESTRIL) 10 mg tablet TAKE 1 TABLET BY MOUTH EVERY DAY 90 tablet 1    pantoprazole (PROTONIX) 40 mg tablet TAKE 1 TABLET BY MOUTH EVERY DAY 30 tablet 5    [DISCONTINUED] methylPREDNISolone 4 MG tablet therapy pack Use as directed on package 1 each 0     No current facility-administered medications on file prior to visit.       Review of Systems   Constitutional:  Negative for chills and fever.   HENT:  Negative for ear pain and sore throat.    Eyes:  Negative for pain and visual disturbance.   Respiratory:  Negative for cough and shortness of breath.    Cardiovascular:  Negative for chest pain and palpitations.   Gastrointestinal:  Negative for abdominal pain and vomiting.   Genitourinary:  Negative for dysuria and hematuria.   Musculoskeletal:  Positive for arthralgias. Negative for back pain.   Skin:  Negative for color change and rash.   Neurological:  Negative for seizures and syncope.   All other systems reviewed and are negative.    See HPi    Objective:    There were no vitals filed for this visit.    Physical Exam  Vitals and nursing note reviewed.   Constitutional:       General: She is not in acute distress.     Appearance: She is well-developed.   HENT:      Head: Normocephalic and atraumatic.   Eyes:      Conjunctiva/sclera: Conjunctivae normal.   Cardiovascular:      Rate and Rhythm: Normal rate and regular rhythm.      Heart sounds: No murmur heard.  Pulmonary:      Effort: Pulmonary effort is normal. No respiratory distress.      Breath sounds: Normal  "breath sounds.   Abdominal:      Palpations: Abdomen is soft.      Tenderness: There is no abdominal tenderness.   Musculoskeletal:         General: No swelling.      Cervical back: Neck supple.   Skin:     General: Skin is warm and dry.      Capillary Refill: Capillary refill takes less than 2 seconds.   Neurological:      Mental Status: She is alert.   Psychiatric:         Mood and Affect: Mood normal.         Right Ankle Exam     Tenderness   Right ankle tenderness location: Posterior tibial.    Tests   Anterior drawer: negative    Other   Sensation: normal  Pulse: present     Comments:  Pain with inversion and eversion of the ankle  No pain with plantarflexion and extension of the ankle              Procedures  No Procedures performed today       Scribe Attestation      I,:  Camelia Damon am acting as a scribe while in the presence of the attending physician.:       I,:  Luigi Crabtree DO personally performed the services described in this documentation    as scribed in my presence.:             Portions of the record may have been created with voice recognition software.  Occasional wrong word or \"sound a like\" substitutions may have occurred due to the inherent limitations of voice recognition software.  Read the chart carefully and recognize, using context, where substitutions have occurred.   "

## 2024-06-25 ENCOUNTER — VBI (OUTPATIENT)
Dept: ADMINISTRATIVE | Facility: OTHER | Age: 54
End: 2024-06-25

## 2024-06-25 NOTE — TELEPHONE ENCOUNTER
06/25/24 11:06 AM     Chart reviewed for Pap Smear (HPV) aka Cervical Cancer Screening ; nothing is submitted to the patient's insurance at this time.     Dianne Delarosa   [facility]

## 2024-07-09 ENCOUNTER — OFFICE VISIT (OUTPATIENT)
Dept: OBGYN CLINIC | Facility: CLINIC | Age: 54
End: 2024-07-09
Payer: COMMERCIAL

## 2024-07-09 VITALS
BODY MASS INDEX: 37.08 KG/M2 | WEIGHT: 216 LBS | DIASTOLIC BLOOD PRESSURE: 87 MMHG | HEART RATE: 65 BPM | SYSTOLIC BLOOD PRESSURE: 156 MMHG

## 2024-07-09 DIAGNOSIS — M76.821 POSTERIOR TIBIAL TENDINITIS OF RIGHT LOWER EXTREMITY: Primary | ICD-10-CM

## 2024-07-09 PROCEDURE — 99244 OFF/OP CNSLTJ NEW/EST MOD 40: CPT | Performed by: PHYSICAL MEDICINE & REHABILITATION

## 2024-07-09 PROCEDURE — 20606 DRAIN/INJ JOINT/BURSA W/US: CPT | Performed by: PHYSICAL MEDICINE & REHABILITATION

## 2024-07-09 RX ORDER — TRIAMCINOLONE ACETONIDE 40 MG/ML
20 INJECTION, SUSPENSION INTRA-ARTICULAR; INTRAMUSCULAR
Status: COMPLETED | OUTPATIENT
Start: 2024-07-09 | End: 2024-07-09

## 2024-07-09 RX ORDER — ROPIVACAINE HYDROCHLORIDE 5 MG/ML
10 INJECTION, SOLUTION EPIDURAL; INFILTRATION; PERINEURAL
Status: COMPLETED | OUTPATIENT
Start: 2024-07-09 | End: 2024-07-09

## 2024-07-09 RX ADMIN — TRIAMCINOLONE ACETONIDE 20 MG: 40 INJECTION, SUSPENSION INTRA-ARTICULAR; INTRAMUSCULAR at 10:15

## 2024-07-09 RX ADMIN — ROPIVACAINE HYDROCHLORIDE 10 ML: 5 INJECTION, SOLUTION EPIDURAL; INFILTRATION; PERINEURAL at 10:15

## 2024-07-09 NOTE — PROGRESS NOTES
"1. Posterior tibial tendinitis of right lower extremity  Ambulatory Referral to Orthopedic Surgery        Orders Placed This Encounter   Procedures    Medium joint arthrocentesis      Impression:  This is a patient referred by Dr. Crabtree's team with right ankle pain 2/2 to posterior tibialis tendinitis.  The patient is a good candidate for USG posterior tibialis tendon sheath injection.  Please see procedure note below.  Patient tolerated the procedure and had immediate relief of symptoms.  She did not have pain when stretching her posterior tibialis as she did prior to injection.  Continue HEP and orthotics which have been helpful.  We will see her back if needed.    Imaging Studies (I personally reviewed images in PACS and report):  Right ankle MRI:  \"Subcutaneous Tissues: Normal.     Joint Effusion: None.     TENDONS:  Achilles tendon: Normal.  Peroneus brevis and longus: Normal.  Posterior tibialis, flexor digitorum longus, flexor hallucis longus: Severe posterior tibialis tenosynovitis without tear (series 5 images 7-26.) Flexor digitorum longus and flexor hallucis longus tendons are intact.  Anterior tibialis, extensor digitorum longus, extensor hallucis longus:  Normal.     LIGAMENTS:  Lateral collateral ligament complex: Chronic avulsion fracture fragment associated with the anterior talofibular ligament (series 4 image 19.) Posterior talofibular and calcaneofibular ligaments are intact.  Distal tibiofibular syndesmosis:  Normal.  Medial collateral ligament complex:  Normal.     Plantar Fascia:  Normal.     Articular Surfaces:  Normal.     Sinus Tarsi:  Normal.     Tarsal Tunnel: Unremarkable.     Bones:  Normal signal intensity. Moderate plantar calcaneal spur.     Musculature:  Normal.     IMPRESSION:     Severe posterior tibialis tenosynovitis without tear (series 5 images 7-26.)     Chronic avulsion fracture fragment associated with the anterior talofibular ligament (series 4 image 19.)\"    No follow-ups " on file.    Patient is in agreement with the above plan.    HPI:  Kristi Sena is a 53 y.o. female  who presents for evaluation of   Chief Complaint   Patient presents with    Right Ankle - Pain, Injections     History of present illness from referring clinician's notes reviewed.     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    Anxiety     Cancer (HCC)     tongue     GERD (gastroesophageal reflux disease)     Herpes zoster     last Assessed 2016    Hypertension     Shingles      Past Surgical History:   Procedure Laterality Date    BREAST LUMPECTOMY Left 2019    Procedure: BREAST LUMPECTOMY; BREAST NEEDLE LOCALIZATION (NEEDLE LOC AT 1130);  Surgeon: Sreedhar Dunn MD;  Location[facility];  Service: Surgical Oncology     SECTION      GLOSSECTOMY Bilateral      Complete with Uniliateral Radical Neck Disscetion    MAMMO NEEDLE LOCALIZATION LEFT (ALL INC) Left 2019    MAMMO STEREOTACTIC BREAST BIOPSY LEFT (ALL INC) Left 2018    TONGUE SURGERY      BX tongue location base     Social History   Social History     Substance and Sexual Activity   Alcohol Use Yes    Comment: Social     Social History     Substance and Sexual Activity   Drug Use No     Social History     Tobacco Use   Smoking Status Never   Smokeless Tobacco Never     Family History   Problem Relation Age of Onset    Diabetes Mother     Heart disease Father     Skin cancer Father 60    No Known Problems Sister     No Known Problems Sister     No Known Problems Daughter     Stomach cancer Maternal Grandmother 87    Prostate cancer Maternal Grandfather 82    No Known Problems Paternal Grandmother     No Known Problems Paternal Grandfather     Breast cancer Paternal Aunt 50    Breast cancer Paternal Aunt 50    No Known Problems Paternal Aunt     No Known Problems Maternal Aunt      Allergies   Allergen Reactions    Penicillins Throat Swelling      [facility] - [id number]: as achild    Seasonal Ic [Cholestatin]     Sulfites - Food Allergy Hives, Rash and Throat Swelling        Constitutional:  /87   Pulse 65   Wt 98 kg (216 lb)   BMI 37.08 kg/m²    General: NAD.  Eyes: Anicteric sclerae.  Neck: Supple.  Lungs: Unlabored breathing.  Cardiovascular: No lower extremity edema.  Skin: Intact without erythema.  Neurologic: Sensation intact to light touch.  Psychiatric: Mood and affect are appropriate.    Right Ankle Exam     Tenderness   Right ankle tenderness location: Posterior tibialis tendon.    Range of Motion   The patient has normal right ankle ROM.    Other   Erythema: absent  Scars: absent  Sensation: normal  Pulse: present              Medium joint arthrocentesis: R ankle  Universal Protocol:  Consent: Verbal consent obtained. Written consent not obtained.  Risks and benefits: risks, benefits and alternatives were discussed  Consent given by: patient  Timeout called at: 7/9/2024 10:23 AM.  Patient understanding: patient states understanding of the procedure being performed  Site marked: the operative site was marked  Radiology Images displayed and confirmed. If images not available, report reviewed: imaging studies available  Patient identity confirmed: verbally with patient  Supporting Documentation  Indications: pain and diagnostic evaluation   Procedure Details  Location: ankle - R ankle  Needle size: 25 G  Ultrasound guidance: yes  Medications administered: 20 mg triamcinolone acetonide 40 mg/mL; 10 mL ropivacaine 0.5 %    Patient tolerance: patient tolerated the procedure well with no immediate complications  Dressing:  Sterile dressing applied    The posterior tibialis tendon was visualized with ultrasound and injected with steroid/anesthetic solution as indicated.    Prior to the injection, the ultrasound was used to evaluate for any neural or vascular structures.  Care was taken to avoid these structures.    The images (and video if taken)  were saved to the [employer] ultrasound system.    Prior to the procedure, the patient was informed of the following risks in layman terms:    - Risk of bleeding since a needle is involved.  - Risk of infection (1/10,000 chance as per recent studies).  Signs/symptoms were discussed and they would prompt an urgent evaluation at an emergency department.  - Risk of pigmentation or skin dimpling in the skin (2-3% chance as per recent studies) from the steroid.  - Risk of increased pain from steroid flare (1% chance as per recent studies) that typically lasts 24-48 hours.  - Risk of increased blood sugars from the steroid medication that can last for a few weeks.  If the patient is a diabetic or pre-diabetic, they were encouraged to closely monitor their blood sugars and discuss with PCP if elevated more than usual or if having symptoms.    After going over these risks, we decided that the benefits outweigh the risks and proceeded with the procedure.

## 2024-07-21 DIAGNOSIS — I10 ESSENTIAL HYPERTENSION: ICD-10-CM

## 2024-07-21 RX ORDER — LISINOPRIL 10 MG/1
TABLET ORAL
Qty: 30 TABLET | Refills: 0 | Status: SHIPPED | OUTPATIENT
Start: 2024-07-21

## 2024-07-23 DIAGNOSIS — E55.9 VITAMIN D DEFICIENCY: ICD-10-CM

## 2024-07-23 DIAGNOSIS — C02.9 TONGUE CANCER (HCC): Primary | ICD-10-CM

## 2024-07-23 DIAGNOSIS — I10 BENIGN ESSENTIAL HYPERTENSION: ICD-10-CM

## 2024-07-30 ENCOUNTER — APPOINTMENT (OUTPATIENT)
Dept: LAB | Facility: CLINIC | Age: 54
End: 2024-07-30
Payer: COMMERCIAL

## 2024-07-30 DIAGNOSIS — E55.9 VITAMIN D DEFICIENCY: ICD-10-CM

## 2024-07-30 DIAGNOSIS — I10 BENIGN ESSENTIAL HYPERTENSION: ICD-10-CM

## 2024-07-30 LAB
25(OH)D3 SERPL-MCNC: 42.1 NG/ML (ref 30–100)
ALBUMIN SERPL BCG-MCNC: 4 G/DL (ref 3.5–5)
ALP SERPL-CCNC: 51 U/L (ref 34–104)
ALT SERPL W P-5'-P-CCNC: 14 U/L (ref 7–52)
ANION GAP SERPL CALCULATED.3IONS-SCNC: 6 MMOL/L (ref 4–13)
AST SERPL W P-5'-P-CCNC: 15 U/L (ref 13–39)
BASOPHILS # BLD AUTO: 0.05 THOUSANDS/ÂΜL (ref 0–0.1)
BASOPHILS NFR BLD AUTO: 1 % (ref 0–1)
BILIRUB SERPL-MCNC: 0.88 MG/DL (ref 0.2–1)
BUN SERPL-MCNC: 16 MG/DL (ref 5–25)
CALCIUM SERPL-MCNC: 9.1 MG/DL (ref 8.4–10.2)
CHLORIDE SERPL-SCNC: 101 MMOL/L (ref 96–108)
CHOLEST SERPL-MCNC: 183 MG/DL
CO2 SERPL-SCNC: 30 MMOL/L (ref 21–32)
CREAT SERPL-MCNC: 0.63 MG/DL (ref 0.6–1.3)
EOSINOPHIL # BLD AUTO: 0.45 THOUSAND/ÂΜL (ref 0–0.61)
EOSINOPHIL NFR BLD AUTO: 6 % (ref 0–6)
ERYTHROCYTE [DISTWIDTH] IN BLOOD BY AUTOMATED COUNT: 14.2 % (ref 11.6–15.1)
GFR SERPL CREATININE-BSD FRML MDRD: 102 ML/MIN/1.73SQ M
GLUCOSE P FAST SERPL-MCNC: 98 MG/DL (ref 65–99)
HCT VFR BLD AUTO: 41.8 % (ref 34.8–46.1)
HDLC SERPL-MCNC: 52 MG/DL
HGB BLD-MCNC: 13.4 G/DL (ref 11.5–15.4)
IMM GRANULOCYTES # BLD AUTO: 0.03 THOUSAND/UL (ref 0–0.2)
IMM GRANULOCYTES NFR BLD AUTO: 0 % (ref 0–2)
LDLC SERPL CALC-MCNC: 105 MG/DL (ref 0–100)
LYMPHOCYTES # BLD AUTO: 2.32 THOUSANDS/ÂΜL (ref 0.6–4.47)
LYMPHOCYTES NFR BLD AUTO: 32 % (ref 14–44)
MCH RBC QN AUTO: 29.1 PG (ref 26.8–34.3)
MCHC RBC AUTO-ENTMCNC: 32.1 G/DL (ref 31.4–37.4)
MCV RBC AUTO: 91 FL (ref 82–98)
MONOCYTES # BLD AUTO: 0.7 THOUSAND/ÂΜL (ref 0.17–1.22)
MONOCYTES NFR BLD AUTO: 10 % (ref 4–12)
NEUTROPHILS # BLD AUTO: 3.66 THOUSANDS/ÂΜL (ref 1.85–7.62)
NEUTS SEG NFR BLD AUTO: 51 % (ref 43–75)
NRBC BLD AUTO-RTO: 0 /100 WBCS
PLATELET # BLD AUTO: 253 THOUSANDS/UL (ref 149–390)
PMV BLD AUTO: 10.8 FL (ref 8.9–12.7)
POTASSIUM SERPL-SCNC: 4.6 MMOL/L (ref 3.5–5.3)
PROT SERPL-MCNC: 6.8 G/DL (ref 6.4–8.4)
RBC # BLD AUTO: 4.61 MILLION/UL (ref 3.81–5.12)
SODIUM SERPL-SCNC: 137 MMOL/L (ref 135–147)
TRIGL SERPL-MCNC: 129 MG/DL
TSH SERPL DL<=0.05 MIU/L-ACNC: 2.73 UIU/ML (ref 0.45–4.5)
WBC # BLD AUTO: 7.21 THOUSAND/UL (ref 4.31–10.16)

## 2024-07-30 PROCEDURE — 36415 COLL VENOUS BLD VENIPUNCTURE: CPT | Performed by: FAMILY MEDICINE

## 2024-07-30 PROCEDURE — 84443 ASSAY THYROID STIM HORMONE: CPT | Performed by: FAMILY MEDICINE

## 2024-07-30 PROCEDURE — 80061 LIPID PANEL: CPT

## 2024-07-30 PROCEDURE — 82306 VITAMIN D 25 HYDROXY: CPT

## 2024-08-22 ENCOUNTER — VBI (OUTPATIENT)
Dept: ADMINISTRATIVE | Facility: OTHER | Age: 54
End: 2024-08-22

## 2024-08-22 DIAGNOSIS — K21.9 GASTROESOPHAGEAL REFLUX DISEASE WITHOUT ESOPHAGITIS: ICD-10-CM

## 2024-08-22 DIAGNOSIS — I10 ESSENTIAL HYPERTENSION: ICD-10-CM

## 2024-08-22 NOTE — TELEPHONE ENCOUNTER
08/22/24 6:39 AM     Chart reviewed for CRC: Colonoscopy ; nothing is submitted to the patient's insurance at this time.     Dianne Delarosa   [facility]

## 2024-08-23 RX ORDER — PANTOPRAZOLE SODIUM 40 MG/1
TABLET, DELAYED RELEASE ORAL
Qty: 30 TABLET | Refills: 0 | Status: SHIPPED | OUTPATIENT
Start: 2024-08-23

## 2024-08-23 RX ORDER — LISINOPRIL 10 MG/1
TABLET ORAL
Qty: 30 TABLET | Refills: 0 | Status: SHIPPED | OUTPATIENT
Start: 2024-08-23

## 2024-08-23 NOTE — TELEPHONE ENCOUNTER
Clerical advised to schedule patient, has not been seen in 1 year  
Pt will call back to schedule med review or physical.   
Requested medication(s) are due for refill today: Yes  Patient has already received a courtesy refill: No  Other reason request has been forwarded to provider: per protocol. Patient will call back to schedule, can we give 30 day supply?  
49.5

## 2024-09-20 DIAGNOSIS — I10 ESSENTIAL HYPERTENSION: ICD-10-CM

## 2024-09-20 DIAGNOSIS — K21.9 GASTROESOPHAGEAL REFLUX DISEASE WITHOUT ESOPHAGITIS: ICD-10-CM

## 2024-09-20 RX ORDER — LISINOPRIL 10 MG/1
TABLET ORAL
Qty: 90 TABLET | Refills: 1 | Status: SHIPPED | OUTPATIENT
Start: 2024-09-20

## 2024-09-20 RX ORDER — PANTOPRAZOLE SODIUM 40 MG/1
TABLET, DELAYED RELEASE ORAL
Qty: 90 TABLET | Refills: 1 | Status: SHIPPED | OUTPATIENT
Start: 2024-09-20

## 2024-10-01 DIAGNOSIS — F41.9 ANXIETY: ICD-10-CM

## 2024-10-01 NOTE — TELEPHONE ENCOUNTER
Refill must be reviewed and completed by the office or provider. The refill is unable to be approved or denied by the medication management team.    Last seen 09.2023, no upcoming appointment - Please review to see if the refill is appropriate.

## 2024-10-02 RX ORDER — FLUOXETINE 40 MG/1
40 CAPSULE ORAL DAILY
Qty: 90 CAPSULE | Refills: 0 | Status: SHIPPED | OUTPATIENT
Start: 2024-10-02

## 2024-10-02 NOTE — TELEPHONE ENCOUNTER
Requested medication(s) are due for refill today: Yes  Patient has already received a courtesy refill: No  Other reason request has been forwarded to provider: per protocol. Clerical advised to schedule pt

## 2024-11-11 ENCOUNTER — OFFICE VISIT (OUTPATIENT)
Dept: FAMILY MEDICINE CLINIC | Facility: CLINIC | Age: 54
End: 2024-11-11
Payer: COMMERCIAL

## 2024-11-11 VITALS
HEIGHT: 62 IN | SYSTOLIC BLOOD PRESSURE: 140 MMHG | BODY MASS INDEX: 40.67 KG/M2 | DIASTOLIC BLOOD PRESSURE: 90 MMHG | OXYGEN SATURATION: 99 % | HEART RATE: 74 BPM | RESPIRATION RATE: 17 BRPM | TEMPERATURE: 98.4 F | WEIGHT: 221 LBS

## 2024-11-11 DIAGNOSIS — Z12.11 COLON CANCER SCREENING: ICD-10-CM

## 2024-11-11 DIAGNOSIS — I10 ESSENTIAL HYPERTENSION: ICD-10-CM

## 2024-11-11 DIAGNOSIS — F41.9 ANXIETY AND DEPRESSION: ICD-10-CM

## 2024-11-11 DIAGNOSIS — N95.1 MENOPAUSAL SYMPTOMS: ICD-10-CM

## 2024-11-11 DIAGNOSIS — Z00.00 ANNUAL PHYSICAL EXAM: Primary | ICD-10-CM

## 2024-11-11 DIAGNOSIS — E66.9 OBESITY (BMI 30-39.9): ICD-10-CM

## 2024-11-11 DIAGNOSIS — I10 BENIGN ESSENTIAL HYPERTENSION: ICD-10-CM

## 2024-11-11 DIAGNOSIS — F32.A ANXIETY AND DEPRESSION: ICD-10-CM

## 2024-11-11 DIAGNOSIS — K21.9 GASTROESOPHAGEAL REFLUX DISEASE WITHOUT ESOPHAGITIS: ICD-10-CM

## 2024-11-11 DIAGNOSIS — Z23 ENCOUNTER FOR IMMUNIZATION: ICD-10-CM

## 2024-11-11 PROCEDURE — 90673 RIV3 VACCINE NO PRESERV IM: CPT

## 2024-11-11 PROCEDURE — 99396 PREV VISIT EST AGE 40-64: CPT | Performed by: FAMILY MEDICINE

## 2024-11-11 PROCEDURE — 90471 IMMUNIZATION ADMIN: CPT

## 2024-11-11 PROCEDURE — 99214 OFFICE O/P EST MOD 30 MIN: CPT | Performed by: FAMILY MEDICINE

## 2024-11-11 RX ORDER — LISINOPRIL 20 MG/1
20 TABLET ORAL DAILY
Qty: 90 TABLET | Refills: 0 | Status: SHIPPED | OUTPATIENT
Start: 2024-11-11

## 2024-11-11 RX ORDER — BUPROPION HYDROCHLORIDE 150 MG/1
150 TABLET ORAL EVERY MORNING
Qty: 30 TABLET | Refills: 5 | Status: SHIPPED | OUTPATIENT
Start: 2024-11-11 | End: 2025-05-10

## 2024-11-11 RX ORDER — PANTOPRAZOLE SODIUM 40 MG/1
40 TABLET, DELAYED RELEASE ORAL DAILY
Qty: 90 TABLET | Refills: 1 | Status: SHIPPED | OUTPATIENT
Start: 2024-11-11

## 2024-11-11 NOTE — ASSESSMENT & PLAN NOTE
Prior Authorization Clinical Questions for Weight Management Pharmacotherapy    1. Does the patient have a contrainidcation to medication prescribed for weight management?: No  2. Does the patient have a diagnosis of obesity, confirmed by a BMI greater than or equal to 30 kg/m^2?: Yes  3. Does the patient have a BMI of greater than or equal to 27 kg/m^2 with at least one weight-related comorbidity/risk factor/complication (e.g. diabetes, dyslipidemia, coronary artery disease)?: Yes  5. Has the patient been on a weight loss regimen of low-calorie diet, increased physical activity, and lifestyle modifications for a minimum of 6 months?: Yes  6. Has the patient completed a comprehensive weight loss program (ie, Weight Watchers, Noom, Bariatrics, other devan on phone)? If so, what?: No  7. Does the patient have a history of type 2 diabetes?: No  8. Has the member tried and failed other weight loss medication within the past 12 months?: No  9. Will the member use requested medication in combination with another GLP agonist or weight loss drug?: No  10. Is the medication a controlled substance?: No     Baseline weight (in pounds): 221 lbs     She will look into GLP1 injections  Food diary with [employer] pal

## 2024-11-11 NOTE — ASSESSMENT & PLAN NOTE
Worsening anxiety   Added wellbutrin today     Orders:    buPROPion (WELLBUTRIN XL) 150 mg 24 hr tablet; Take 1 tablet (150 mg total) by mouth every morning

## 2024-11-11 NOTE — PROGRESS NOTES
Adult Annual Physical  Name: Kristi Sena      : 1970      MRN: [id number]  Encounter Provider: Kristi Patel MD  Encounter Date: 2024   Encounter department: [facility]    Assessment & Plan  Annual physical exam    Orders:    Comprehensive metabolic panel    CBC and differential    Lipid panel    Hemoglobin A1C    Benign essential hypertension  Not controlled  Increased lisinopril to 20 mg daily       Orders:    Comprehensive metabolic panel    CBC and differential    Lipid panel    Hemoglobin A1C    Anxiety and depression  Worsening anxiety   Added wellbutrin today     Orders:    buPROPion (WELLBUTRIN XL) 150 mg 24 hr tablet; Take 1 tablet (150 mg total) by mouth every morning    Gastroesophageal reflux disease without esophagitis  Couldn't wear off pantoprazole  To continue as directed    Orders:    pantoprazole (PROTONIX) 40 mg tablet; Take 1 tablet (40 mg total) by mouth daily    Essential hypertension    Orders:    lisinopril (ZESTRIL) 20 mg tablet; Take 1 tablet (20 mg total) by mouth daily    Colon cancer screening    Orders:    Cologuard    Menopausal symptoms    Orders:    Ambulatory Referral to Obstetrics / Gynecology; Future    Obesity (BMI 30-39.9)  Prior Authorization Clinical Questions for Weight Management Pharmacotherapy    1. Does the patient have a contrainidcation to medication prescribed for weight management?: No  2. Does the patient have a diagnosis of obesity, confirmed by a BMI greater than or equal to 30 kg/m^2?: Yes  3. Does the patient have a BMI of greater than or equal to 27 kg/m^2 with at least one weight-related comorbidity/risk factor/complication (e.g. diabetes, dyslipidemia, coronary artery disease)?: Yes  5. Has the patient been on a weight loss regimen of low-calorie diet, increased physical activity, and lifestyle modifications for a minimum of 6 months?: Yes  6. Has the patient completed a comprehensive weight loss program (ie, Weight  Watchers, Noom, Bariatrics, other devan on phone)? If so, what?: No  7. Does the patient have a history of type 2 diabetes?: No  8. Has the member tried and failed other weight loss medication within the past 12 months?: No  9. Will the member use requested medication in combination with another GLP agonist or weight loss drug?: No  10. Is the medication a controlled substance?: No     Baseline weight (in pounds): 221 lbs     She will look into GLP1 injections  Food diary with alyseness pal         Encounter for immunization    Orders:    influenza vaccine, recombinant, PF, 0.5 mL IM (Flublok)    Immunizations and preventive care screenings were discussed with patient today. Appropriate education was printed on patient's after visit summary.    Counseling:  Alcohol/drug use: discussed moderation in alcohol intake, the recommendations for healthy alcohol use, and avoidance of illicit drug use.  Dental Health: discussed importance of regular tooth brushing, flossing, and dental visits.  Injury prevention: discussed safety/seat belts, safety helmets, smoke detectors, carbon monoxide detectors, and smoking near bedding or upholstery.  Sexual health: discussed sexually transmitted diseases, partner selection, use of condoms, avoidance of unintended pregnancy, and contraceptive alternatives.  Exercise: the importance of regular exercise/physical activity was discussed. Recommend exercise 3-5 times per week for at least 30 minutes.       Depression Screening and Follow-up Plan: Patient was screened for depression during today's encounter. They screened negative with a PHQ-2 score of 2.        History of Present Illness     Here for follow up and physical  Drinking more alcohol 2-3 times a week  Her mind is going everywhere  Not suicidal or homicidal ideation   More anxious and down   C/o hot flashes and mood swings for 3 years now since menopause  Gained weight since then     Adult Annual Physical:  Patient presents for  annual physical.     Diet and Physical Activity:  - Diet/Nutrition: poor diet.  - Exercise: walking.    Depression Screening:  - PHQ-2 Score: 2    General Health:  - Sleep: sleeps poorly.  - Hearing: normal hearing bilateral ears.  - Vision: goes for regular eye exams and wears glasses.  - Dental: brushes teeth twice daily.    /GYN Health:  - Follows with GYN: no.   - Menopause: postmenopausal.   - History of STDs: no    Advanced Care Planning:  - Has an advanced directive?: no    - Has a durable medical POA?: no    - ACP document given to patient?: no      Review of Systems   Constitutional:  Negative for chills and fever.   HENT:  Negative for ear pain and sore throat.    Eyes:  Negative for pain and visual disturbance.   Respiratory:  Negative for cough and shortness of breath.    Cardiovascular:  Negative for chest pain and palpitations.   Gastrointestinal:  Negative for abdominal pain and vomiting.   Genitourinary:  Negative for dysuria and hematuria.   Musculoskeletal:  Negative for arthralgias and back pain.   Skin:  Negative for color change and rash.   Neurological:  Negative for seizures and syncope.   Hematological: Negative.    Psychiatric/Behavioral:  The patient is nervous/anxious.    All other systems reviewed and are negative.    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    Anxiety     Arthritis     Cancer (HCC) 2009    tongue     Depression     GERD (gastroesophageal reflux disease)     Herpes zoster     last Assessed 2016    Hypertension     Shingles      Past Surgical History:   Procedure Laterality Date    BREAST LUMPECTOMY Left 1/29/2019    Procedure: BREAST LUMPECTOMY; BREAST NEEDLE LOCALIZATION (NEEDLE LOC AT 1130);  Surgeon: Sreedhar Dunn MD;  Location: [facility];  Service: Surgical Oncology      SECTION      GLOSSECTOMY Bilateral      Complete with Uniliateral Radical Neck Disscetion    MAMMO NEEDLE LOCALIZATION LEFT (ALL INC) Left 2019    MAMMO STEREOTACTIC BREAST BIOPSY LEFT (ALL INC) Left 2018    TONGUE SURGERY      BX tongue location base     Family History   Problem Relation Age of Onset    Diabetes Mother     Heart disease Father     Skin cancer Father 60    No Known Problems Sister     No Known Problems Sister     No Known Problems Daughter     Stomach cancer Maternal Grandmother 87    Prostate cancer Maternal Grandfather 82    No Known Problems Paternal Grandmother     No Known Problems Paternal Grandfather     Breast cancer Paternal Aunt 50    Breast cancer Paternal Aunt 50    No Known Problems Paternal Aunt     No Known Problems Maternal Aunt      Current Outpatient Medications on File Prior to Visit   Medication Sig Dispense Refill    FLUoxetine (PROzac) 40 MG capsule TAKE 1 CAPSULE BY MOUTH EVERY DAY 90 capsule 0    fluticasone (FLONASE) 50 mcg/act nasal spray SPRAY 2 SPRAYS INTO EACH NOSTRIL EVERY DAY 16 mL 0    [DISCONTINUED] lisinopril (ZESTRIL) 10 mg tablet TAKE 1 TABLET BY MOUTH EVERY DAY 90 tablet 1    [DISCONTINUED] pantoprazole (PROTONIX) 40 mg tablet TAKE 1 TABLET BY MOUTH EVERY DAY 90 tablet 1     No current facility-administered medications on file prior to visit.     Allergies   Allergen Reactions    Penicillins Throat Swelling     Annotation - [id number]: as achild    Seasonal Ic [Cholestatin]     Sulfites - Food Allergy Hives, Rash and Throat Swelling      Current Outpatient Medications on File Prior to Visit   Medication Sig Dispense Refill    FLUoxetine (PROzac) 40 MG capsule TAKE 1 CAPSULE BY MOUTH EVERY DAY 90 capsule 0    fluticasone (FLONASE) 50 mcg/act nasal spray SPRAY 2 SPRAYS INTO EACH NOSTRIL EVERY DAY 16 mL 0    [DISCONTINUED] lisinopril (ZESTRIL) 10 mg tablet TAKE 1 TABLET BY MOUTH EVERY DAY 90 tablet 1    [DISCONTINUED] pantoprazole (PROTONIX) 40  "mg tablet TAKE 1 TABLET BY MOUTH EVERY DAY 90 tablet 1     No current facility-administered medications on file prior to visit.      Social History     Tobacco Use    Smoking status: Never     Passive exposure: Never    Smokeless tobacco: Never   Vaping Use    Vaping status: Never Used   Substance and Sexual Activity    Alcohol use: Yes     Comment: Social    Drug use: No    Sexual activity: Not on file       Objective     /90 (BP Location: Right arm, Patient Position: Sitting, Cuff Size: Standard)   Pulse 74   Temp 98.4 °F (36.9 °C) (Tympanic)   Resp 17   Ht 5' 2.48\" (1.587 m)   Wt 100 kg (221 lb)   SpO2 99%   BMI 39.80 kg/m²     Physical Exam  Vitals and nursing note reviewed.   Constitutional:       Appearance: Normal appearance. She is well-developed.   HENT:      Head: Normocephalic and atraumatic.      Right Ear: Tympanic membrane normal.      Left Ear: Tympanic membrane normal.      Nose: Nose normal.      Mouth/Throat:      Mouth: Mucous membranes are moist.   Eyes:      Pupils: Pupils are equal, round, and reactive to light.   Neck:      Thyroid: No thyromegaly.   Cardiovascular:      Rate and Rhythm: Normal rate and regular rhythm.      Pulses: Normal pulses.      Heart sounds: Normal heart sounds. No murmur heard.  Pulmonary:      Effort: Pulmonary effort is normal.      Breath sounds: Normal breath sounds.   Abdominal:      General: Bowel sounds are normal.      Palpations: Abdomen is soft.   Musculoskeletal:         General: No deformity. Normal range of motion.      Cervical back: Normal range of motion and neck supple.   Skin:     General: Skin is warm.      Findings: No erythema or rash.   Neurological:      General: No focal deficit present.      Mental Status: She is alert and oriented to person, place, and time.      Deep Tendon Reflexes: Reflexes are normal and symmetric.   Psychiatric:         Mood and Affect: Mood normal.         Behavior: Behavior normal.         "

## 2024-11-11 NOTE — ASSESSMENT & PLAN NOTE
Not controlled  Increased lisinopril to 20 mg daily       Orders:    Comprehensive metabolic panel    CBC and differential    Lipid panel    Hemoglobin A1C

## 2024-11-11 NOTE — ASSESSMENT & PLAN NOTE
Couldn't wear off pantoprazole  To continue as directed    Orders:    pantoprazole (PROTONIX) 40 mg tablet; Take 1 tablet (40 mg total) by mouth daily

## 2024-11-12 DIAGNOSIS — Z12.31 BREAST CANCER SCREENING BY MAMMOGRAM: Primary | ICD-10-CM

## 2024-11-19 ENCOUNTER — VBI (OUTPATIENT)
Dept: ADMINISTRATIVE | Facility: OTHER | Age: 54
End: 2024-11-19

## 2024-11-19 NOTE — TELEPHONE ENCOUNTER
11/19/24 8:50 AM     Chart reviewed for Blood Pressure was/were submitted to the patient's insurance.     Dianne Delarosa   [facility]

## 2024-12-05 DIAGNOSIS — F41.9 ANXIETY AND DEPRESSION: ICD-10-CM

## 2024-12-05 DIAGNOSIS — F32.A ANXIETY AND DEPRESSION: ICD-10-CM

## 2024-12-05 RX ORDER — BUPROPION HYDROCHLORIDE 150 MG/1
150 TABLET ORAL EVERY MORNING
Qty: 90 TABLET | Refills: 2 | Status: SHIPPED | OUTPATIENT
Start: 2024-12-05

## 2024-12-12 ENCOUNTER — HOSPITAL ENCOUNTER (OUTPATIENT)
Dept: MAMMOGRAPHY | Facility: HOSPITAL | Age: 54
Discharge: HOME | End: 2024-12-12
Payer: COMMERCIAL

## 2024-12-12 VITALS — HEIGHT: 63 IN | WEIGHT: 221 LBS | BODY MASS INDEX: 39.16 KG/M2

## 2024-12-12 DIAGNOSIS — F41.9 ANXIETY: ICD-10-CM

## 2024-12-12 DIAGNOSIS — Z12.31 BREAST CANCER SCREENING BY MAMMOGRAM: ICD-10-CM

## 2024-12-12 PROCEDURE — 77063 BREAST TOMOSYNTHESIS BI: CPT

## 2024-12-12 PROCEDURE — 77067 SCR MAMMO BI INCL CAD: CPT

## 2024-12-12 RX ORDER — FLUOXETINE 40 MG/1
40 CAPSULE ORAL DAILY
Qty: 90 CAPSULE | Refills: 1 | Status: SHIPPED | OUTPATIENT
Start: 2024-12-12

## 2024-12-16 ENCOUNTER — RESULTS FOLLOW-UP (OUTPATIENT)
Dept: FAMILY MEDICINE CLINIC | Facility: CLINIC | Age: 54
End: 2024-12-16

## 2025-01-04 DIAGNOSIS — I10 ESSENTIAL HYPERTENSION: ICD-10-CM

## 2025-01-05 RX ORDER — LISINOPRIL 20 MG/1
20 TABLET ORAL DAILY
Qty: 90 TABLET | Refills: 1 | Status: SHIPPED | OUTPATIENT
Start: 2025-01-05

## 2025-02-21 ENCOUNTER — OFFICE VISIT (OUTPATIENT)
Dept: FAMILY MEDICINE CLINIC | Facility: CLINIC | Age: 55
End: 2025-02-21
Payer: COMMERCIAL

## 2025-02-21 VITALS
HEART RATE: 65 BPM | HEIGHT: 63 IN | TEMPERATURE: 98.1 F | DIASTOLIC BLOOD PRESSURE: 80 MMHG | WEIGHT: 214 LBS | SYSTOLIC BLOOD PRESSURE: 130 MMHG | OXYGEN SATURATION: 96 % | BODY MASS INDEX: 37.92 KG/M2 | RESPIRATION RATE: 17 BRPM

## 2025-02-21 DIAGNOSIS — K21.9 GASTROESOPHAGEAL REFLUX DISEASE WITHOUT ESOPHAGITIS: ICD-10-CM

## 2025-02-21 DIAGNOSIS — F41.9 ANXIETY AND DEPRESSION: ICD-10-CM

## 2025-02-21 DIAGNOSIS — F32.A ANXIETY AND DEPRESSION: ICD-10-CM

## 2025-02-21 DIAGNOSIS — I10 BENIGN ESSENTIAL HYPERTENSION: ICD-10-CM

## 2025-02-21 DIAGNOSIS — E66.9 OBESITY (BMI 30-39.9): Primary | ICD-10-CM

## 2025-02-21 PROCEDURE — 99214 OFFICE O/P EST MOD 30 MIN: CPT | Performed by: FAMILY MEDICINE

## 2025-02-21 NOTE — ASSESSMENT & PLAN NOTE
Initial weight: 221 lbs   Current weight: 214 lbs   She wants to try wegovy   Diet, exercise and portion control     Orders:  •  Semaglutide-Weight Management (WEGOVY) 0.25 MG/0.5ML; Inject 0.5 mL (0.25 mg total) under the skin once a week for 28 days

## 2025-02-21 NOTE — PROGRESS NOTES
"Name: Kristi Sena      : 1970      MRN: [id number]  Encounter Provider: Kristi Patel MD  Encounter Date: 2025   Encounter department: [facility]  :  Assessment & Plan  Obesity (BMI 30-39.9)  Initial weight: 221 lbs   Current weight: 214 lbs   She wants to try wegovy   Diet, exercise and portion control     Orders:  •  Semaglutide-Weight Management (WEGOVY) 0.25 MG/0.5ML; Inject 0.5 mL (0.25 mg total) under the skin once a week for 28 days    Anxiety and depression  Stable on current meds       Gastroesophageal reflux disease without esophagitis  Doing on pantoprazole       Benign essential hypertension  Stable on current meds              History of Present Illness   HPI  Here for follow up  Feels better on wellbutrin   Less anxious and depressed       Review of Systems   Constitutional: Negative.    HENT: Negative.     Eyes: Negative.    Respiratory: Negative.     Endocrine: Negative.    Genitourinary: Negative.    Neurological: Negative.    Hematological: Negative.    Psychiatric/Behavioral: Negative.         Objective   /80 (BP Location: Left arm, Patient Position: Sitting, Cuff Size: Standard)   Pulse 65   Temp 98.1 °F (36.7 °C) (Tympanic)   Resp 17   Ht 5' 2.5\" (1.588 m)   Wt 97.1 kg (214 lb)   SpO2 96%   BMI 38.52 kg/m²      Physical Exam  Vitals and nursing note reviewed.   Constitutional:       Appearance: Normal appearance.   HENT:      Head: Normocephalic and atraumatic.      Right Ear: Tympanic membrane normal.      Left Ear: Tympanic membrane normal.      Nose: Nose normal.      Mouth/Throat:      Mouth: Mucous membranes are moist.   Cardiovascular:      Rate and Rhythm: Normal rate and regular rhythm.      Pulses: Normal pulses.      Heart sounds: Normal heart sounds.   Pulmonary:      Effort: Pulmonary effort is normal.      Breath sounds: Normal breath sounds.   Neurological:      General: No focal deficit present.      Mental Status: She is alert and " oriented to person, place, and time.   Psychiatric:         Mood and Affect: Mood normal.         Behavior: Behavior normal.

## 2025-02-24 ENCOUNTER — TELEPHONE (OUTPATIENT)
Age: 55
End: 2025-02-24

## 2025-02-24 DIAGNOSIS — K21.9 GASTROESOPHAGEAL REFLUX DISEASE WITHOUT ESOPHAGITIS: ICD-10-CM

## 2025-02-24 NOTE — TELEPHONE ENCOUNTER
BERTA SENY 0.25 MG/0.5ML SUBMITTED     to DONALD     via    []CMM-KEY:    [x]Surescripts-Case ID # [id number]   []Availity-Auth ID #  NDC #     []Faxed to plan   []Other website    []Phone call Case ID #      []PA sent as URGENT    All office notes, labs and other pertaining documents and studies sent. Clinical questions answered. Awaiting determination from insurance company.     Turnaround time for your insurance to make a decision on your Prior Authorization can take 7-21 business days.

## 2025-02-25 RX ORDER — PANTOPRAZOLE SODIUM 40 MG/1
40 TABLET, DELAYED RELEASE ORAL DAILY
Qty: 90 TABLET | Refills: 1 | Status: SHIPPED | OUTPATIENT
Start: 2025-02-25

## 2025-02-25 NOTE — TELEPHONE ENCOUNTER
Please have patient call [employer], they are telling us that they are not her primary insurance, but the payer of last resort. Either this is an error on [employer]'s part or patient has primary insurance.

## 2025-02-26 NOTE — TELEPHONE ENCOUNTER
Patient was called and made aware of insurance  message, Patient stated she will call her insurance as this has happen, before will reach out once she has spoken to them.

## 2025-02-28 DIAGNOSIS — E66.9 OBESITY (BMI 30-39.9): ICD-10-CM

## 2025-02-28 NOTE — TELEPHONE ENCOUNTER
Requested medication(s) are due for refill today: No  Patient has already received a courtesy refill: No  Other reason request has been forwarded to provider: please refuse

## 2025-04-15 ENCOUNTER — VBI (OUTPATIENT)
Dept: ADMINISTRATIVE | Facility: OTHER | Age: 55
End: 2025-04-15

## 2025-04-16 DIAGNOSIS — E66.9 OBESITY (BMI 30-39.9): Primary | ICD-10-CM

## 2025-04-16 RX ORDER — SEMAGLUTIDE 0.5 MG/.5ML
INJECTION, SOLUTION SUBCUTANEOUS
Qty: 2 ML | Refills: 0 | Status: SHIPPED | OUTPATIENT
Start: 2025-04-16 | End: 2026-04-08

## 2025-04-24 ENCOUNTER — RESULTS FOLLOW-UP (OUTPATIENT)
Dept: FAMILY MEDICINE CLINIC | Facility: CLINIC | Age: 55
End: 2025-04-24

## 2025-04-24 ENCOUNTER — APPOINTMENT (OUTPATIENT)
Dept: LAB | Facility: CLINIC | Age: 55
End: 2025-04-24
Payer: COMMERCIAL

## 2025-04-24 LAB
ALBUMIN SERPL BCG-MCNC: 4.1 G/DL (ref 3.5–5)
ALP SERPL-CCNC: 49 U/L (ref 34–104)
ALT SERPL W P-5'-P-CCNC: 14 U/L (ref 7–52)
ANION GAP SERPL CALCULATED.3IONS-SCNC: 7 MMOL/L (ref 4–13)
AST SERPL W P-5'-P-CCNC: 15 U/L (ref 13–39)
BASOPHILS # BLD AUTO: 0.02 THOUSANDS/ÂΜL (ref 0–0.1)
BASOPHILS NFR BLD AUTO: 0 % (ref 0–1)
BILIRUB SERPL-MCNC: 0.54 MG/DL (ref 0.2–1)
BUN SERPL-MCNC: 14 MG/DL (ref 5–25)
CALCIUM SERPL-MCNC: 9.4 MG/DL (ref 8.4–10.2)
CHLORIDE SERPL-SCNC: 101 MMOL/L (ref 96–108)
CHOLEST SERPL-MCNC: 175 MG/DL (ref ?–200)
CO2 SERPL-SCNC: 29 MMOL/L (ref 21–32)
CREAT SERPL-MCNC: 0.61 MG/DL (ref 0.6–1.3)
EOSINOPHIL # BLD AUTO: 0.3 THOUSAND/ÂΜL (ref 0–0.61)
EOSINOPHIL NFR BLD AUTO: 5 % (ref 0–6)
ERYTHROCYTE [DISTWIDTH] IN BLOOD BY AUTOMATED COUNT: 13.8 % (ref 11.6–15.1)
EST. AVERAGE GLUCOSE BLD GHB EST-MCNC: 108 MG/DL
GFR SERPL CREATININE-BSD FRML MDRD: 102 ML/MIN/1.73SQ M
GLUCOSE P FAST SERPL-MCNC: 97 MG/DL (ref 65–99)
HBA1C MFR BLD: 5.4 %
HCT VFR BLD AUTO: 41.3 % (ref 34.8–46.1)
HDLC SERPL-MCNC: 45 MG/DL
HGB BLD-MCNC: 13.4 G/DL (ref 11.5–15.4)
IMM GRANULOCYTES # BLD AUTO: 0.02 THOUSAND/UL (ref 0–0.2)
IMM GRANULOCYTES NFR BLD AUTO: 0 % (ref 0–2)
LDLC SERPL CALC-MCNC: 110 MG/DL (ref 0–100)
LYMPHOCYTES # BLD AUTO: 1.88 THOUSANDS/ÂΜL (ref 0.6–4.47)
LYMPHOCYTES NFR BLD AUTO: 28 % (ref 14–44)
MCH RBC QN AUTO: 29.3 PG (ref 26.8–34.3)
MCHC RBC AUTO-ENTMCNC: 32.4 G/DL (ref 31.4–37.4)
MCV RBC AUTO: 90 FL (ref 82–98)
MONOCYTES # BLD AUTO: 0.63 THOUSAND/ÂΜL (ref 0.17–1.22)
MONOCYTES NFR BLD AUTO: 9 % (ref 4–12)
NEUTROPHILS # BLD AUTO: 3.89 THOUSANDS/ÂΜL (ref 1.85–7.62)
NEUTS SEG NFR BLD AUTO: 58 % (ref 43–75)
NONHDLC SERPL-MCNC: 130 MG/DL
NRBC BLD AUTO-RTO: 0 /100 WBCS
PLATELET # BLD AUTO: 269 THOUSANDS/UL (ref 149–390)
PMV BLD AUTO: 11.1 FL (ref 8.9–12.7)
POTASSIUM SERPL-SCNC: 4 MMOL/L (ref 3.5–5.3)
PROT SERPL-MCNC: 7.1 G/DL (ref 6.4–8.4)
RBC # BLD AUTO: 4.58 MILLION/UL (ref 3.81–5.12)
SODIUM SERPL-SCNC: 137 MMOL/L (ref 135–147)
TRIGL SERPL-MCNC: 101 MG/DL (ref ?–150)
WBC # BLD AUTO: 6.74 THOUSAND/UL (ref 4.31–10.16)

## 2025-05-21 ENCOUNTER — OFFICE VISIT (OUTPATIENT)
Dept: FAMILY MEDICINE CLINIC | Facility: CLINIC | Age: 55
End: 2025-05-21
Payer: COMMERCIAL

## 2025-05-21 VITALS
SYSTOLIC BLOOD PRESSURE: 122 MMHG | HEIGHT: 62 IN | BODY MASS INDEX: 37.13 KG/M2 | WEIGHT: 201.8 LBS | OXYGEN SATURATION: 95 % | DIASTOLIC BLOOD PRESSURE: 80 MMHG | RESPIRATION RATE: 16 BRPM | HEART RATE: 73 BPM | TEMPERATURE: 97.6 F

## 2025-05-21 DIAGNOSIS — Z23 ENCOUNTER FOR IMMUNIZATION: ICD-10-CM

## 2025-05-21 DIAGNOSIS — F32.A ANXIETY AND DEPRESSION: ICD-10-CM

## 2025-05-21 DIAGNOSIS — F41.9 ANXIETY AND DEPRESSION: ICD-10-CM

## 2025-05-21 DIAGNOSIS — E66.9 OBESITY (BMI 30-39.9): ICD-10-CM

## 2025-05-21 DIAGNOSIS — I10 BENIGN ESSENTIAL HYPERTENSION: Primary | ICD-10-CM

## 2025-05-21 PROCEDURE — 90471 IMMUNIZATION ADMIN: CPT

## 2025-05-21 PROCEDURE — 90472 IMMUNIZATION ADMIN EACH ADD: CPT

## 2025-05-21 PROCEDURE — 90677 PCV20 VACCINE IM: CPT

## 2025-05-21 PROCEDURE — 90750 HZV VACC RECOMBINANT IM: CPT

## 2025-05-21 PROCEDURE — 99214 OFFICE O/P EST MOD 30 MIN: CPT | Performed by: FAMILY MEDICINE

## 2025-05-21 RX ORDER — SEMAGLUTIDE 0.5 MG/.5ML
INJECTION, SOLUTION SUBCUTANEOUS
Qty: 2 ML | Refills: 0 | Status: SHIPPED | OUTPATIENT
Start: 2025-05-21 | End: 2026-05-13

## 2025-05-21 NOTE — PROGRESS NOTES
"Name: Kristi Sena      : 1970      MRN: [id number]  Encounter Provider: Kristi Patel MD  Encounter Date: 2025   Encounter department: [facility]  :  Assessment & Plan  Benign essential hypertension  Stable on current meds         Obesity (BMI 30-39.9)  Initial weight: 221 lbs  Current weight: 201 lbs  Continue diet, exercise and portion control     Orders:  •  Semaglutide-Weight Management (Wegovy) 0.5 MG/0.5ML; Inject 0.5 mg under the skin weekly    Anxiety and depression  Doing well on wellbutrin and prozac       Encounter for immunization    Orders:  •  Pneumococcal Conjugate Vaccine 20-valent (Pcv20)  •  Zoster Vaccine Recombinant IM           History of Present Illness   HPI  Here for follow up  Some belching and nausea with wegovy   Eating healthier   Exercising more   Review of Systems   Constitutional: Negative.    HENT: Negative.     Respiratory: Negative.     Cardiovascular: Negative.    Gastrointestinal: Negative.    Genitourinary: Negative.    Musculoskeletal: Negative.    Hematological: Negative.    Psychiatric/Behavioral: Negative.         Objective   /80 (BP Location: Right arm, Patient Position: Sitting, Cuff Size: Standard)   Pulse 73   Temp 97.6 °F (36.4 °C) (Tympanic)   Resp 16   Ht 5' 2\" (1.575 m)   Wt 91.5 kg (201 lb 12.8 oz)   SpO2 95%   BMI 36.91 kg/m²      Physical Exam  Vitals and nursing note reviewed.   Constitutional:       Appearance: Normal appearance.     Cardiovascular:      Rate and Rhythm: Normal rate and regular rhythm.      Pulses: Normal pulses.      Heart sounds: Normal heart sounds.     Skin:     Capillary Refill: Capillary refill takes less than 2 seconds.     Neurological:      General: No focal deficit present.      Mental Status: She is alert and oriented to person, place, and time.     Psychiatric:         Mood and Affect: Mood normal.         Behavior: Behavior normal.         "

## 2025-05-21 NOTE — ASSESSMENT & PLAN NOTE
Initial weight: 221 lbs  Current weight: 201 lbs  Continue diet, exercise and portion control     Orders:  •  Semaglutide-Weight Management (Wegovy) 0.5 MG/0.5ML; Inject 0.5 mg under the skin weekly

## 2025-05-29 DIAGNOSIS — F41.9 ANXIETY: ICD-10-CM

## 2025-05-30 RX ORDER — FLUOXETINE HYDROCHLORIDE 40 MG/1
40 CAPSULE ORAL DAILY
Qty: 90 CAPSULE | Refills: 1 | Status: SHIPPED | OUTPATIENT
Start: 2025-05-30

## 2025-06-16 ENCOUNTER — VBI (OUTPATIENT)
Dept: ADMINISTRATIVE | Facility: OTHER | Age: 55
End: 2025-06-16

## 2025-06-16 NOTE — TELEPHONE ENCOUNTER
06/16/25 10:28 AM     Chart reviewed for CRC: Colonoscopy ; nothing is submitted to the patient's insurance at this time.     Champ Encarnacion MA   [facility]

## 2025-06-24 DIAGNOSIS — E66.9 OBESITY (BMI 30-39.9): ICD-10-CM

## 2025-06-25 DIAGNOSIS — E66.9 OBESITY (BMI 30-39.9): Primary | ICD-10-CM

## 2025-06-25 RX ORDER — SEMAGLUTIDE 1 MG/.5ML
INJECTION, SOLUTION SUBCUTANEOUS
Qty: 2 ML | Refills: 0 | Status: SHIPPED | OUTPATIENT
Start: 2025-06-25

## 2025-06-25 RX ORDER — SEMAGLUTIDE 0.25 MG/.5ML
INJECTION, SOLUTION SUBCUTANEOUS
OUTPATIENT
Start: 2025-06-25

## 2025-06-25 RX ORDER — SEMAGLUTIDE 0.5 MG/.5ML
INJECTION, SOLUTION SUBCUTANEOUS
Qty: 2 ML | Refills: 0 | OUTPATIENT
Start: 2025-06-25 | End: 2026-06-16

## 2025-07-21 DIAGNOSIS — E66.9 OBESITY (BMI 30-39.9): ICD-10-CM

## 2025-07-23 DIAGNOSIS — E66.9 OBESITY (BMI 30-39.9): ICD-10-CM

## 2025-07-23 DIAGNOSIS — E66.9 OBESITY (BMI 30-39.9): Primary | ICD-10-CM

## 2025-07-23 RX ORDER — SEMAGLUTIDE 1 MG/.5ML
INJECTION, SOLUTION SUBCUTANEOUS
OUTPATIENT
Start: 2025-07-23

## 2025-07-23 RX ORDER — SEMAGLUTIDE 1.7 MG/.75ML
INJECTION, SOLUTION SUBCUTANEOUS
Qty: 3 ML | Refills: 0 | Status: SHIPPED | OUTPATIENT
Start: 2025-07-23

## 2025-07-23 NOTE — TELEPHONE ENCOUNTER
Spoke to the patient, she would like to move to the next dose. She states that she has some side effects but tolerating it ok.

## 2025-07-23 NOTE — TELEPHONE ENCOUNTER
Requested medication(s) are due for refill today: No  Patient has already received a courtesy refill: No  Other reason request has been forwarded to provider: per protocol

## 2025-07-25 RX ORDER — SEMAGLUTIDE 1 MG/.5ML
INJECTION, SOLUTION SUBCUTANEOUS
Qty: 2 ML | Refills: 0 | OUTPATIENT
Start: 2025-07-25

## 2025-08-21 ENCOUNTER — TELEPHONE (OUTPATIENT)
Dept: FAMILY MEDICINE CLINIC | Facility: CLINIC | Age: 55
End: 2025-08-21

## 2025-08-21 ENCOUNTER — OFFICE VISIT (OUTPATIENT)
Dept: FAMILY MEDICINE CLINIC | Facility: CLINIC | Age: 55
End: 2025-08-21
Payer: COMMERCIAL

## 2025-08-21 VITALS
TEMPERATURE: 97.8 F | OXYGEN SATURATION: 98 % | HEIGHT: 62 IN | SYSTOLIC BLOOD PRESSURE: 120 MMHG | RESPIRATION RATE: 17 BRPM | BODY MASS INDEX: 33.49 KG/M2 | DIASTOLIC BLOOD PRESSURE: 80 MMHG | WEIGHT: 182 LBS | HEART RATE: 85 BPM

## 2025-08-21 DIAGNOSIS — K21.9 GASTROESOPHAGEAL REFLUX DISEASE WITHOUT ESOPHAGITIS: ICD-10-CM

## 2025-08-21 DIAGNOSIS — I10 BENIGN ESSENTIAL HYPERTENSION: ICD-10-CM

## 2025-08-21 DIAGNOSIS — F41.9 ANXIETY AND DEPRESSION: ICD-10-CM

## 2025-08-21 DIAGNOSIS — F32.A ANXIETY AND DEPRESSION: ICD-10-CM

## 2025-08-21 DIAGNOSIS — E66.9 OBESITY (BMI 30-39.9): Primary | ICD-10-CM

## 2025-08-21 PROCEDURE — 99214 OFFICE O/P EST MOD 30 MIN: CPT | Performed by: FAMILY MEDICINE

## 2025-08-21 RX ORDER — SEMAGLUTIDE 1.7 MG/.75ML
INJECTION, SOLUTION SUBCUTANEOUS
Qty: 3 ML | Refills: 0 | Status: SHIPPED | OUTPATIENT
Start: 2025-08-21

## (undated) DEVICE — BETHLEHEM UNIVERSAL MINOR GEN: Brand: CARDINAL HEALTH

## (undated) DEVICE — NEEDLE 25G X 1 1/2

## (undated) DEVICE — LIGHT HANDLE COVER SLEEVE DISP BLUE STELLAR

## (undated) DEVICE — TUBING SUCTION 5MM X 12 FT

## (undated) DEVICE — SUT VICRYL 3-0 SH 27 IN J416H

## (undated) DEVICE — MEDI-VAC YANK SUCT HNDL W/TPRD BULBOUS TIP: Brand: CARDINAL HEALTH

## (undated) DEVICE — PENCIL ELECTROSURG E-Z CLEAN -0035H

## (undated) DEVICE — DRAPE EQUIPMENT RF WAND

## (undated) DEVICE — SUT MONOCRYL 4-0 PS-2 18 IN Y496G

## (undated) DEVICE — SMOKE EVACUATION TUBING WITH 8 IN INTEGRAL WAND AND SPONGE GUARD: Brand: BUFFALO FILTER

## (undated) DEVICE — 3M™ STERI-STRIP™ REINFORCED ADHESIVE SKIN CLOSURES, R1547, 1/2 IN X 4 IN (12 MM X 100 MM), 6 STRIPS/ENVELOPE: Brand: 3M™ STERI-STRIP™

## (undated) DEVICE — VIAL DECANTER

## (undated) DEVICE — MARGIN MARKER SET

## (undated) DEVICE — INTENDED FOR TISSUE SEPARATION, AND OTHER PROCEDURES THAT REQUIRE A SHARP SURGICAL BLADE TO PUNCTURE OR CUT.: Brand: BARD-PARKER SAFETY BLADES SIZE 15, STERILE

## (undated) DEVICE — CHLORAPREP HI-LITE 26ML ORANGE

## (undated) DEVICE — PAD GROUNDING ADULT

## (undated) DEVICE — DRAPE PROBE NEO-PROBE/ULTRASOUND

## (undated) DEVICE — GLOVE SRG BIOGEL 7